# Patient Record
Sex: FEMALE | Race: AMERICAN INDIAN OR ALASKA NATIVE | NOT HISPANIC OR LATINO | Employment: UNEMPLOYED | URBAN - METROPOLITAN AREA
[De-identification: names, ages, dates, MRNs, and addresses within clinical notes are randomized per-mention and may not be internally consistent; named-entity substitution may affect disease eponyms.]

---

## 2017-07-05 ENCOUNTER — OFFICE VISIT (OUTPATIENT)
Dept: NEUROLOGY | Facility: CLINIC | Age: 52
End: 2017-07-05
Payer: MEDICAID

## 2017-07-05 VITALS
HEIGHT: 67 IN | WEIGHT: 203.06 LBS | HEART RATE: 79 BPM | SYSTOLIC BLOOD PRESSURE: 128 MMHG | DIASTOLIC BLOOD PRESSURE: 80 MMHG | BODY MASS INDEX: 31.87 KG/M2

## 2017-07-05 DIAGNOSIS — G54.1 LUMBOSACRAL PLEXOPATHY: Primary | ICD-10-CM

## 2017-07-05 PROCEDURE — 99203 OFFICE O/P NEW LOW 30 MIN: CPT | Mod: PBBFAC | Performed by: PSYCHIATRY & NEUROLOGY

## 2017-07-05 PROCEDURE — 99999 PR PBB SHADOW E&M-NEW PATIENT-LVL III: CPT | Mod: PBBFAC,,, | Performed by: PSYCHIATRY & NEUROLOGY

## 2017-07-05 PROCEDURE — 99205 OFFICE O/P NEW HI 60 MIN: CPT | Mod: S$PBB,,, | Performed by: PSYCHIATRY & NEUROLOGY

## 2017-07-05 RX ORDER — PRAVASTATIN SODIUM 40 MG/1
40 TABLET ORAL DAILY
COMMUNITY
End: 2018-11-26

## 2017-07-05 RX ORDER — METOPROLOL SUCCINATE 25 MG/1
25 TABLET, EXTENDED RELEASE ORAL NIGHTLY
COMMUNITY
End: 2021-10-06

## 2017-07-05 RX ORDER — METHYLPREDNISOLONE 4 MG/1
TABLET ORAL
Qty: 1 PACKAGE | Refills: 0 | Status: SHIPPED | OUTPATIENT
Start: 2017-07-05 | End: 2017-07-26

## 2017-07-05 NOTE — PROGRESS NOTES
"Name: Perri Youngblood  MRN: [id number]   CSN: [id number]      Date: 07/05/2017    Referring physician:  Aung Sr III, MD  [address] [place] [id number]    Chief Complaint / Interval History: No chief complaint on file.      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   Went downhill two weeks after, seemed to have severe quad pain and weakness.  Felt heavy in the leg.  Has had persistent spasms in the L thigh.    On June 24, had a detailed ultrasound of the left leg.  Looking for blood flow issues.  Said it's not the stent, but must be "severe nerve damage".    Nonmotor/Premotor ROS:  Hyposmia (HENT)?No  RBD/sleep issues (Constitutional)?No  Depression/anxiety (Psychiatric)?No  Fatigue (Constitutional)?No  Constipation (GI)?No  Urinary issues ()?No  Sexual dysfunction ()?No  Orthostasis (Cardiovascular)?No  Leg swelling (Cardiovascular)? No  Falls (Musculoskeletal)?No  Cognitive impairment (Neurologic)?No  Psychoses (Psychiatric)?No  Pain/Paresthesia (Neurologic)?No  Visual changes (Eyes)?No  Moles / skin changes (Skin)?No  Stridor / SOB (Pulm)?No  Bruising (Heme)?No    Past Medical History: The patient  has no past medical history on file.    Social History: The patient  reports that she has never smoked. She has never used smokeless tobacco. She reports that she does not drink alcohol.    Family History: Their family history is not on file.    Allergies: Review of patient's allergies indicates no known allergies.     Meds:   No current outpatient prescriptions on file prior to visit.     No current facility-administered medications on file prior to visit.        Exam:  /80   Pulse 79   Ht 5' 7" (1.702 m)   Wt 92.1 kg (203 lb 0.7 oz)   BMI 31.80 kg/m²     Constitutional  Well-developed, well-nourished, appears stated age   Ophthalmoscopic  No papilledema with no hemorrhages or exudates bilaterally   Cardiovascular "  Radial pulses 2+ and symmetric, no LE edema bilaterally   Neurological    * Mental status  MOCA =      - Orientation  Oriented to person, place, time, and situation     - Memory   Intact recent and remote     - Attention/concentration  Attentive, vigilant during exam     - Language  Naming & repetition intact, +2-step commands     - Fund of knowledge  Aware of current events     - Executive  Well-organized thoughts     - Other     * Cranial nerves       - CN II  PERRL, visual fields full to confrontation     - CN III, IV, VI  Extraocular movements full, normal pursuits and saccades     - CN V  Sensation V1 - V3 intact     - CN VII  Face strong and symmetric bilaterally     - CN VIII  Hearing intact bilaterally     - CN IX, X  Palate raises midline and symmetric     - CN XI  SCM and trapezius 5/5 bilaterally     - CN XII  Tongue midline   * Motor  Muscle bulk normal, strength 5/5 throughout   * Sensory   Dim to temperature and vibration throughout in approx left L2 below, had female attendant in room for detailed exam.   * Coordination  No dysmetria with finger-to-nose or heel-to-shin   * Gait  See below.   * Deep tendon reflexes  2+ and symmetric throughout   Babinski downgoing bilaterally     Laboratory/Radiological:  - Results:  No visits with results within 3 Month(s) from this visit.   Latest known visit with results is:   No results found for any previous visit.       - Independent review of images: no neuro imaging    Diagnoses:          Plexopathy versus radiculopathy versus nerve.    Medical Decision Making:  L2? Level seems consistent.  - emg  - mri  - pt    Lexa Beavers MD, MPH  Division of Movement and Memory Disorders  [facility]  [phone]

## 2017-07-05 NOTE — LETTER
July 11, 2017      Aung Sr III, MD  [address] [facility] [address]           [facility]  [address]  [place] [address]  Phone: [phone]  Fax: [phone]          Patient: Perri Youngblood   MR Number: [id number]   YOB: 1965   Date of Visit: 7/5/2017       Dear Dr. Aung Sr III:    Thank you for referring Perri Youngblood to me for evaluation. Attached you will find relevant portions of my assessment and plan of care.    If you have questions, please do not hesitate to call me. I look forward to following Perri Youngblood along with you.    Sincerely,    Lexa Beavers MD    Enclosure  CC:  No Recipients    If you would like to receive this communication electronically, please contact [email or web address] or [phone] to request more information on [employer] Link access.    For providers and/or their staff who would like to refer a patient to [facility], please contact us through our one-stop-shop provider referral line, [facility] [place], at [phone].    If you feel you have received this communication in error or would no longer like to receive these types of communications, please e-mail [email or web address]

## 2017-07-17 ENCOUNTER — HOSPITAL ENCOUNTER (OUTPATIENT)
Dept: RADIOLOGY | Facility: HOSPITAL | Age: 52
Discharge: HOME | End: 2017-07-17
Attending: PSYCHIATRY & NEUROLOGY
Payer: MEDICAID

## 2017-07-17 DIAGNOSIS — G54.1 LUMBOSACRAL PLEXOPATHY: ICD-10-CM

## 2017-07-17 PROCEDURE — 72148 MRI LUMBAR SPINE W/O DYE: CPT | Mod: TC

## 2017-07-17 PROCEDURE — 72148 MRI LUMBAR SPINE W/O DYE: CPT | Mod: 26,,, | Performed by: RADIOLOGY

## 2017-07-25 ENCOUNTER — PROCEDURE VISIT (OUTPATIENT)
Dept: NEUROLOGY | Facility: CLINIC | Age: 52
End: 2017-07-25
Payer: MEDICAID

## 2017-07-25 DIAGNOSIS — G54.1 LUMBOSACRAL PLEXOPATHY: ICD-10-CM

## 2017-07-25 PROCEDURE — 95886 MUSC TEST DONE W/N TEST COMP: CPT | Mod: PBBFAC,PO | Performed by: NEUROMUSCULOSKELETAL MEDICINE & OMM

## 2017-07-25 PROCEDURE — 95910 NRV CNDJ TEST 7-8 STUDIES: CPT | Mod: PBBFAC,PO | Performed by: NEUROMUSCULOSKELETAL MEDICINE & OMM

## 2017-07-25 PROCEDURE — 95886 MUSC TEST DONE W/N TEST COMP: CPT | Mod: 26,S$PBB,, | Performed by: NEUROMUSCULOSKELETAL MEDICINE & OMM

## 2017-07-25 PROCEDURE — 95910 NRV CNDJ TEST 7-8 STUDIES: CPT | Mod: 26,S$PBB,, | Performed by: NEUROMUSCULOSKELETAL MEDICINE & OMM

## 2017-08-10 ENCOUNTER — TELEPHONE (OUTPATIENT)
Dept: NEUROLOGY | Facility: CLINIC | Age: 52
End: 2017-08-10

## 2017-08-10 NOTE — TELEPHONE ENCOUNTER
----- Message from Rao Crisostomo sent at 8/9/2017  3:58 PM CDT -----  Contact: Self @ [phone]  Pt is calling for results on MRI and nerve damage test. Pls call.

## 2017-08-24 ENCOUNTER — TELEPHONE (OUTPATIENT)
Dept: NEUROLOGY | Facility: CLINIC | Age: 52
End: 2017-08-24

## 2017-08-24 NOTE — TELEPHONE ENCOUNTER
----- Message from Grettagreyson Landhua sent at 8/24/2017  2:13 PM CDT -----  Contact: self @ [phone] or belinda () @ [phone]  Pt is calling for her MRI results from 7-17-17 and her EMG results from 7-25-17.  Pt says she is still in a lot of pain.  pls call to discuss asap.

## 2017-08-24 NOTE — TELEPHONE ENCOUNTER
Spoken to patient  and he stated that Pt would like her MRI results from 7-17-17 and her EMG results from 7-25-17..      Please call regarding results!   Perri [phone]

## 2017-09-20 ENCOUNTER — TELEPHONE (OUTPATIENT)
Dept: NEUROLOGY | Facility: CLINIC | Age: 52
End: 2017-09-20

## 2017-09-20 NOTE — TELEPHONE ENCOUNTER
----- Message from Rao Crisostomo sent at 9/20/2017 10:42 AM CDT -----  Contact: Self @ [phone]  Pt would like to schedule f/u with doctor to discuss EMG (07/25)/ MRI (07/17) results. Pls call.

## 2017-09-20 NOTE — TELEPHONE ENCOUNTER
Pt would like a call regarding her results on her MRI and EMG. 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    Perri  [phone]

## 2017-12-11 ENCOUNTER — OFFICE VISIT (OUTPATIENT)
Dept: NEUROLOGY | Facility: CLINIC | Age: 52
End: 2017-12-11
Payer: MEDICAID

## 2017-12-11 VITALS
HEART RATE: 88 BPM | SYSTOLIC BLOOD PRESSURE: 110 MMHG | BODY MASS INDEX: 29.76 KG/M2 | HEIGHT: 67 IN | DIASTOLIC BLOOD PRESSURE: 68 MMHG | WEIGHT: 189.63 LBS

## 2017-12-11 DIAGNOSIS — M54.17 LUMBOSACRAL RADICULOPATHY AT L2: Primary | ICD-10-CM

## 2017-12-11 PROCEDURE — 99999 PR PBB SHADOW E&M-EST. PATIENT-LVL III: CPT | Mod: PBBFAC,,, | Performed by: PSYCHIATRY & NEUROLOGY

## 2017-12-11 PROCEDURE — 99214 OFFICE O/P EST MOD 30 MIN: CPT | Mod: S$PBB,,, | Performed by: PSYCHIATRY & NEUROLOGY

## 2017-12-11 PROCEDURE — 99213 OFFICE O/P EST LOW 20 MIN: CPT | Mod: PBBFAC | Performed by: PSYCHIATRY & NEUROLOGY

## 2017-12-11 RX ORDER — OXYBUTYNIN CHLORIDE 5 MG/1
TABLET, EXTENDED RELEASE ORAL
COMMUNITY
Start: 2017-12-08 | End: 2017-12-18 | Stop reason: ALTCHOICE

## 2017-12-11 RX ORDER — ALENDRONATE SODIUM 70 MG/1
TABLET ORAL
COMMUNITY
Start: 2017-12-08 | End: 2018-12-12

## 2017-12-11 RX ORDER — GABAPENTIN 300 MG/1
CAPSULE ORAL
COMMUNITY
Start: 2017-12-08 | End: 2017-12-22

## 2017-12-11 NOTE — PROGRESS NOTES
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   Went downhill two weeks after, seemed to have severe quad pain and weakness.  Felt heavy in the leg.  Has had persistent spasms in the L thigh.    On June 24, had a detailed ultrasound of the left leg.  Looking for blood flow issues.  Said it's not the stent, but must be "severe nerve damage".    Nonmotor/Premotor ROS:  Hyposmia (HENT)?No  RBD/sleep issues (Constitutional)?No  Depression/anxiety (Psychiatric)?No  Fatigue (Constitutional)?No  Constipation (GI)?No  Urinary issues ()?No  Sexual dysfunction ()?No  Orthostasis (Cardiovascular)?No  Leg swelling (Cardiovascular)? No  Falls (Musculoskeletal)?No  Cognitive impairment (Neurologic)?No  Psychoses (Psychiatric)?No  Pain/Paresthesia (Neurologic)?No  Visual changes (Eyes)?No  Moles / skin changes (Skin)?No  Stridor / SOB (Pulm)?No  Bruising (Heme)?No    Past Medical History: The patient  has no past medical history on file.    Social History: The patient  reports that she has never smoked. She has never used smokeless tobacco. She reports that she does not drink alcohol.    Family History: Their family history is not on file.    Allergies: Patient has no known allergies.     Meds:   Current Outpatient Prescriptions on File Prior to Visit   Medication Sig Dispense Refill    metoprolol succinate (TOPROL-XL) 25 MG 24 hr tablet Take 25 mg by mouth once daily.      pravastatin (PRAVACHOL) 40 MG tablet Take 40 mg by mouth once daily.       No current facility-administered medications on file prior to visit.        Exam:  /68   Pulse 88   Ht 5' 7" (1.702 m)   Wt 86 kg (189 lb 9.5 oz)   BMI 29.69 kg/m² "     Constitutional  Well-developed, well-nourished, appears stated age   Cardiovascular  Radial pulses 2+ and symmetric, no LE edema bilaterally   Neurological    * Mental status  MOCA deferred      - Orientation  Oriented to person, place, time, and situation     - Memory   Intact recent and remote     - Attention/concentration  Attentive, vigilant during exam     - Language  Naming & repetition intact, +2-step commands     - Fund of knowledge  Aware of current events     - Executive  Well-organized thoughts     - Other     * Cranial nerves       - CN II  PERRL, visual fields full to confrontation     - CN III, IV, VI  Extraocular movements full, normal pursuits and saccades     - CN V  Sensation V1 - V3 intact     - CN VII  Face strong and symmetric bilaterally     - CN VIII  Hearing intact bilaterally     - CN IX, X  Palate raises midline and symmetric     - CN XI  SCM and trapezius 5/5 bilaterally     - CN XII  Tongue midline   * Motor  Muscle bulk normal, strength 5/5 throughout except 4+/5 abduction L>R   * Sensory   Deferred this exam   * Coordination  No dysmetria with finger-to-nose or heel-to-shin   * Gait  See below.   * Deep tendon reflexes  2+ and symmetric throughout - intact   Babinski downgoing bilaterally     Laboratory/Radiological:  - Results:        Reviewed EMG:  Conclusion:   BILATERAL L2-4 CHRONIC NEUROGENIC ATROPHY CONSISTENT WITH RADICULOPATHY   LEFT > > RIGHT     Diagnoses:          L2-3 lumbar radiculopathy    Medical Decision Making:  - referral to Back and Spine    Lexa Beavers MD, MPH  Division of Movement and Memory Disorders  [facility]  [phone]

## 2017-12-21 NOTE — PROGRESS NOTES
Subjective:      Patient ID: Perri Youngblood is a 52 y.o. female.    Chief Complaint: Low-back Pain and Leg Pain (bilateral)    Ms Youngblood is a 53 yo female sent by Dr. Beavers for evaluation of low back and left leg pain.  She has had low back pain sine jan 2016 when she had a stent placed in her left leg.  The pain started 2 weeks after.  The pain is worse in the lower back and goes to the right lower back around to the groin on the left and down the outside of the left leg to the knee.  The right front of the leg is starting to hurt.  The pain is constant.  The pain is worse with standing and walking.  She feels better with sitting.  She feels like heat also feels better.  The pain is a sharp pain.  There is no numbness and no tingling.  The pain is 8/10 now, worst 10/10 walking, best 5/10 with heat.  She uses asper cream, and all the creams.  She has taken gabapentin but stopped taking it.  She was taking 900 mg.  She has tried advil and aleve or tylenol.  She had a steroid felicitas that did help for about a month.      EMG  BILATERAL L2-4 CHRONIC NEUROGENIC ATROPHY CONSISTENT WITH RADICULOPATHY  LEFT > > RIGHT    MRI lumbar spine 7/17/2017  Slight grade 1 retrolisthesis of L2 on L3. The vertebral body heights are well maintained, with no fracture.  No marrow signal abnormality suspicious for an infiltrative process.  Multilevel degenerative disc disease with desiccation and severe disc height loss with endplate changes, most notably at L2-3.    The conus is normal in appearance, and terminates at the L1-2 level. Partially-imaged exophytic, 1.4 cm fluid signal focus adjacent to the right kidney, likely a renal cyst. The adjacent soft tissue structures show no significant abnormalities.        L1-L2:  No significant spinal canal or neuroforaminal narrowing.    L2-L3: Circumferential disc bulge with a superimposed small right paracentral disc extrusion extending a few millimeters superiorly, and mild  bilateral facet arthrosis result in mild spinal canal stenosis and mild right neuroforaminal narrowing.    L3-L4: Circumferential disc bulge, ligamentum flavum hypertrophy, and mild bilateral facet arthrosis contributes to moderate right and mild left neuroforaminal narrowing with possible impingement on the exiting right L3 nerve root. No spinal canal stenosis.    L4-L5: Circumferential disc bulge, ligamentum flavum hypertrophy, and mild bilateral facet arthrosis contributing mild right neuroforaminal narrowing. No significant spinal canal stenosis.    L5-S1: Small posterior disc protrusion and mild bilateral facet arthrosis without significant spinal canal stenosis or neuroforaminal narrowing.  Impression          Lumbar spondylosis, resulting in mild spinal canal stenosis at L2-3, and mild/moderate neuroforaminal narrowing at L2-3 and L3-4, as above.    Small disc extrusion at L2-3.        Reviewed EMG:  Conclusion:   BILATERAL L2-4 CHRONIC NEUROGENIC ATROPHY CONSISTENT WITH RADICULOPATHY   LEFT > > RIGHT      Diagnoses:                                                                                           L2-3 lumbar radiculopathy    No past medical history on file.    No past surgical history on file.    No family history on file.      Social History    Marital status:              Spouse name:                       Years of education:                 Number of children:               Social History Main Topics    Smoking status: Never Smoker                                                                Smokeless tobacco: Never Used                        Alcohol use: No                Current Outpatient Prescriptions:  alendronate (FOSAMAX) 70 MG tablet, , Disp: , Rfl:   aspirin (ECOTRIN) 81 MG EC tablet, Take 81 mg by mouth once daily., Disp: , Rfl:   gabapentin (NEURONTIN) 300 MG capsule, , Disp: , Rfl:   metoprolol succinate (TOPROL-XL) 25 MG 24 hr tablet, Take 25 mg by mouth once daily., Disp: ,  Rfl:   oxybutynin (DITROPAN-XL) 10 MG 24 hr tablet, Take 1 tablet (10 mg total) by mouth once daily., Disp: 30 tablet, Rfl: 11  pravastatin (PRAVACHOL) 40 MG tablet, Take 40 mg by mouth once daily., Disp: , Rfl:     No current facility-administered medications for this visit.       Review of patient's allergies indicates:   -- Bactrim (sulfamethoxazole-trimethoprim) -- Rash   -- Sulfa (sulfonamide antibiotics) -- Hives          Review of Systems   Constitution: Negative for weight gain and weight loss.   Cardiovascular: Negative for chest pain.   Respiratory: Negative for shortness of breath.    Musculoskeletal: Positive for back pain (left leg pain). Negative for joint pain and joint swelling.   Gastrointestinal: Negative for abdominal pain and bowel incontinence.   Genitourinary: Negative for bladder incontinence.   Neurological: Negative for numbness.         Objective:        General: Perri is well-developed, well-nourished, appears stated age, in no acute distress, alert and oriented to time, place and person.     General    Vitals reviewed.  Constitutional: She is oriented to person, place, and time. She appears well-developed and well-nourished.   HENT:   Head: Normocephalic and atraumatic.   Pulmonary/Chest: Effort normal.   Neurological: She is alert and oriented to person, place, and time.   Psychiatric: She has a normal mood and affect. Her behavior is normal. Judgment and thought content normal.     General Musculoskeletal Exam   Gait: antalgic     Right Ankle/Foot Exam     Tests   Heel Walk: able to perform  Tiptoe Walk: able to perform    Left Ankle/Foot Exam     Tests   Heel Walk: able to perform  Tiptoe Walk: able to perform  Left Hip Exam     Tenderness   The patient tender to palpation of the psoas tendon.    Range of Motion   Extension: 0   Flexion: 80   Internal Rotation: 20 (with leg pain)   External Rotation: 50 (with leg pain)       Back (L-Spine & T-Spine) / Neck (C-Spine) Exam     Tenderness  Posterior midline palpation reveals tenderness of the Lower L-Spine.     Back (L-Spine & T-Spine) Range of Motion   Extension: 10   Flexion: 70   Lateral Bend Right: 20   Lateral Bend Left: 20   Rotation Right: 40   Rotation Left: 40     Spinal Sensation   Right Side Sensation  C-Spine Level: normal   L-Spine Level: normal  S-Spine Level: normal  Left Side Sensation  C-Spine Level: normal  L-Spine Level: normal  S-Spine Level: normal    Back (L-Spine & T-Spine) Tests   Right Side Tests  Straight leg raise:      Sitting SLR: > 70 degrees      Left Side Tests  Straight leg raise:     Sitting SLR: > 70 degrees          Other She has no scoliosis .  Spinal Kyphosis:  Absent      Muscle Strength   Right Upper Extremity   Biceps: 5/5/5   Deltoid:  5/5  Triceps:  5/5  Wrist Extension: 5/5/5   Finger Flexors:  5/5  Left Upper Extremity  Biceps: 5/5/5   Deltoid:  5/5  Triceps:  5/5  Wrist Extension: 5/5/5   Finger Flexors:  5/5  Right Lower Extremity   Hip Flexion: 5/5   Quadriceps:  5/5   Anterior tibial:  5/5/5  EHL:  5/5  Left Lower Extremity   Hip Flexion: 5/5   Quadriceps:  5/5   Anterior tibial:  5/5/5   EHL:  5/5    Reflexes     Left Side  Biceps:  2+  Triceps:  2+  Brachioradialis:  2+  Quadriceps:  2+  Achilles:  2+  Left Gutierrez's Sign:  Absent  Babinski Sign:  absent    Right Side   Biceps:  2+  Triceps:  2+  Brachioradialis:  2+  Quadriceps:  2+  Achilles:  2+  Right Gutierrez's Sign:  absent  Babinski Sign:  absent    Vascular Exam     Right Pulses        Carotid:                  2+    Left Pulses        Carotid:                  2+              Assessment:       1. Chronic midline low back pain with bilateral sciatica    2. Pain of left hip joint    3. DDD (degenerative disc disease), lumbar           Plan:       Orders Placed This Encounter    X-Ray Lumbar Complete With Flex And Ext    X-Ray Hips Bilateral 2 View Inc AP Pelvis    Ambulatory Referral to Physical/Occupational Therapy    meloxicam (MOBIC) 15  MG tablet    gabapentin (NEURONTIN) 300 MG capsule     More than 50% of the total time of 45 minutes was spent in counseling on diagnosis and treatment options.  We discussed back and leg pain and the nature of back and leg pain.  We discussed the L2 radiculopathy, but also discussed hip DJD with the limited hip rom.  We discussed her gait, and usually more from hip problems.  We also discussed ROM  Limited, and pain recreated by moving the hip.  We will get x-rays.   We discussed the benefits of therapy and exercise and continuing to move. She has not done therapy.  She has also not had nsaid  1.  X-ray of the left hip and lumbar spine  2.  mobic 15 mg po Qday  3.  PT for back strengthening, core strengthening, hip ROM and hip strengthening and HEP in [place]  4.  Gabapentin 300-900 at night  5.  RTC 10 weeks      Follow-up: Return in about 10 weeks (around 3/2/2018). If there are any questions prior to this, the patient was instructed to contact the office.

## 2017-12-22 ENCOUNTER — HOSPITAL ENCOUNTER (OUTPATIENT)
Dept: RADIOLOGY | Facility: OTHER | Age: 52
Discharge: HOME | End: 2017-12-22
Attending: PHYSICAL MEDICINE & REHABILITATION
Payer: MEDICAID

## 2017-12-22 ENCOUNTER — OFFICE VISIT (OUTPATIENT)
Dept: SPINE | Facility: CLINIC | Age: 52
End: 2017-12-22
Attending: PHYSICAL MEDICINE & REHABILITATION
Payer: MEDICAID

## 2017-12-22 ENCOUNTER — TELEPHONE (OUTPATIENT)
Dept: SPINE | Facility: CLINIC | Age: 52
End: 2017-12-22

## 2017-12-22 VITALS
DIASTOLIC BLOOD PRESSURE: 68 MMHG | HEIGHT: 67 IN | BODY MASS INDEX: 29.03 KG/M2 | WEIGHT: 185 LBS | HEART RATE: 75 BPM | SYSTOLIC BLOOD PRESSURE: 119 MMHG

## 2017-12-22 DIAGNOSIS — G89.29 CHRONIC MIDLINE LOW BACK PAIN WITH BILATERAL SCIATICA: ICD-10-CM

## 2017-12-22 DIAGNOSIS — M54.41 CHRONIC MIDLINE LOW BACK PAIN WITH BILATERAL SCIATICA: Primary | ICD-10-CM

## 2017-12-22 DIAGNOSIS — M51.36 DDD (DEGENERATIVE DISC DISEASE), LUMBAR: ICD-10-CM

## 2017-12-22 DIAGNOSIS — M25.552 PAIN OF LEFT HIP JOINT: ICD-10-CM

## 2017-12-22 DIAGNOSIS — M54.41 CHRONIC MIDLINE LOW BACK PAIN WITH BILATERAL SCIATICA: ICD-10-CM

## 2017-12-22 DIAGNOSIS — M54.42 CHRONIC MIDLINE LOW BACK PAIN WITH BILATERAL SCIATICA: Primary | ICD-10-CM

## 2017-12-22 DIAGNOSIS — M54.42 CHRONIC MIDLINE LOW BACK PAIN WITH BILATERAL SCIATICA: ICD-10-CM

## 2017-12-22 DIAGNOSIS — G89.29 CHRONIC MIDLINE LOW BACK PAIN WITH BILATERAL SCIATICA: Primary | ICD-10-CM

## 2017-12-22 PROCEDURE — 72114 X-RAY EXAM L-S SPINE BENDING: CPT | Mod: 26,,, | Performed by: RADIOLOGY

## 2017-12-22 PROCEDURE — 99204 OFFICE O/P NEW MOD 45 MIN: CPT | Mod: S$PBB,,, | Performed by: PHYSICAL MEDICINE & REHABILITATION

## 2017-12-22 PROCEDURE — 73521 X-RAY EXAM HIPS BI 2 VIEWS: CPT | Mod: TC

## 2017-12-22 PROCEDURE — 73521 X-RAY EXAM HIPS BI 2 VIEWS: CPT | Mod: 26,,, | Performed by: RADIOLOGY

## 2017-12-22 PROCEDURE — 72114 X-RAY EXAM L-S SPINE BENDING: CPT | Mod: TC

## 2017-12-22 PROCEDURE — 99214 OFFICE O/P EST MOD 30 MIN: CPT | Mod: PBBFAC | Performed by: PHYSICAL MEDICINE & REHABILITATION

## 2017-12-22 PROCEDURE — 99999 PR PBB SHADOW E&M-EST. PATIENT-LVL IV: CPT | Mod: PBBFAC,,, | Performed by: PHYSICAL MEDICINE & REHABILITATION

## 2017-12-22 RX ORDER — GABAPENTIN 300 MG/1
300-900 CAPSULE ORAL NIGHTLY
Qty: 90 CAPSULE | Refills: 0
Start: 2017-12-22 | End: 2018-02-27

## 2017-12-22 RX ORDER — MELOXICAM 15 MG/1
15 TABLET ORAL DAILY
Qty: 30 TABLET | Refills: 2 | Status: SHIPPED | OUTPATIENT
Start: 2017-12-22 | End: 2018-05-30 | Stop reason: SDUPTHER

## 2017-12-22 NOTE — LETTER
December 22, 2017      Lexa Beavers MD  [address]  [facility] [address]           [facility]  [address], [address]  [facility] [address]  Phone: [phone]  Fax: [phone]          Patient: Perri Youngblood   MR Number: [id number]   YOB: 1965   Date of Visit: 12/22/2017       Dear Dr. Lexa Beavers:    Thank you for referring Perri Youngblood to me for evaluation. Attached you will find relevant portions of my assessment and plan of care.    If you have questions, please do not hesitate to call me. I look forward to following Perri Youngblood along with you.    Sincerely,    Cassy Cunha MD    Enclosure  CC:  No Recipients    If you would like to receive this communication electronically, please contact [email or web address] or [phone] to request more information on [employer] Link access.    For providers and/or their staff who would like to refer a patient to [facility], please contact us through our one-stop-shop provider referral line, [facility], at [phone].    If you feel you have received this communication in error or would no longer like to receive these types of communications, please e-mail [email or web address]

## 2017-12-22 NOTE — TELEPHONE ENCOUNTER
X-ray of the hip was reviewed.  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  She will proceed with therapy and we will get her scheduled to see someone for her left hip DJD

## 2017-12-28 ENCOUNTER — INITIAL CONSULT (OUTPATIENT)
Dept: ORTHOPEDICS | Facility: CLINIC | Age: 52
End: 2017-12-28
Payer: MEDICAID

## 2017-12-28 DIAGNOSIS — M16.12 ARTHRITIS OF LEFT HIP: Primary | ICD-10-CM

## 2017-12-28 PROCEDURE — 99204 OFFICE O/P NEW MOD 45 MIN: CPT | Mod: S$PBB,,, | Performed by: ORTHOPAEDIC SURGERY

## 2017-12-28 PROCEDURE — 99212 OFFICE O/P EST SF 10 MIN: CPT | Mod: PBBFAC | Performed by: ORTHOPAEDIC SURGERY

## 2017-12-28 PROCEDURE — 99999 PR PBB SHADOW E&M-EST. PATIENT-LVL II: CPT | Mod: PBBFAC,,, | Performed by: ORTHOPAEDIC SURGERY

## 2017-12-28 NOTE — LETTER
December 29, 2017      Cassy Cunha MD  [address]  [address]  [facility]  [facility] [address]           [facility]  [address]  [place] [address]  Phone: [phone]          Patient: Perri Youngblood   MR Number: [id number]   YOB: 1965   Date of Visit: 12/28/2017       Dear Dr. Cassy Cunha:    Thank you for referring Perri Youngblood to me for evaluation. Attached you will find relevant portions of my assessment and plan of care.    If you have questions, please do not hesitate to call me. I look forward to following Perri Youngblood along with you.    Sincerely,    Hal Oviedo MD    Enclosure  CC:  No Recipients    If you would like to receive this communication electronically, please contact [email or web address] or [phone] to request more information on [employer] Link access.    For providers and/or their staff who would like to refer a patient to [facility], please contact us through our one-stop-shop provider referral line, [facility], at [phone].    If you feel you have received this communication in error or would no longer like to receive these types of communications, please e-mail [email or web address]

## 2017-12-30 NOTE — PROGRESS NOTES
DATE: 12/29/2017  PATIENT: Perri Youngblood    Attending Physician: Hal Oviedo M.D.    CHIEF COMPLAINT: Left groin pain.    HISTORY:  Perri Youngblood is a 52 y.o. female  here for initial evaluation of low back pain and left groin pain (Back - 8, Groin - 8). The pain has been present since she had a Left iliac artery stent. The patient describes the pain as aching in the L groin.  The pain is worse with putting on shoes and socks and improved by a heatign pad. There is no associated numbness and tingling. There is no subjective weakness. Prior treatments have included mobic, but no PT or injections.    The Patient denies myelopathic symptoms such as handwriting changes or difficulty with buttons/coins/keys. Denies perineal paresthesias, bowel/bladder dysfunction.    PAST MEDICAL/SURGICAL HISTORY:  No past medical history on file.  No past surgical history on file.    Current Medications:   Current Outpatient Prescriptions:     alendronate (FOSAMAX) 70 MG tablet, , Disp: , Rfl:     aspirin (ECOTRIN) 81 MG EC tablet, Take 81 mg by mouth once daily., Disp: , Rfl:     gabapentin (NEURONTIN) 300 MG capsule, Take 1-3 capsules (300-900 mg total) by mouth every evening., Disp: 90 capsule, Rfl: 0    meloxicam (MOBIC) 15 MG tablet, Take 1 tablet (15 mg total) by mouth once daily., Disp: 30 tablet, Rfl: 2    metoprolol succinate (TOPROL-XL) 25 MG 24 hr tablet, Take 25 mg by mouth once daily., Disp: , Rfl:     oxybutynin (DITROPAN-XL) 10 MG 24 hr tablet, Take 1 tablet (10 mg total) by mouth once daily., Disp: 30 tablet, Rfl: 11    pravastatin (PRAVACHOL) 40 MG tablet, Take 40 mg by mouth once daily., Disp: , Rfl:     Social History:   Social History     Social History    Marital status:      Spouse name: N/A    Number of children: N/A    Years of education: N/A     Occupational History    Not on file.     Social History Main Topics    Smoking status: Never Smoker    Smokeless tobacco:  Never Used    Alcohol use No    Drug use: Unknown    Sexual activity: Not on file     Other Topics Concern    Not on file     Social History Narrative    No narrative on file       REVIEW OF SYSTEMS:  Constitution: Negative. Negative for chills, fever and night sweats.   Cardiovascular: Negative for chest pain and syncope.   Respiratory: Negative for cough and shortness of breath.   Gastrointestinal: See HPI. Negative for nausea/vomiting. Negative for abdominal pain.  Genitourinary: See HPI. Negative for discoloration or dysuria.  Skin: Negative for dry skin, itching and rash.   Hematologic/Lymphatic: Negative for bleeding/clotting disorders.   Musculoskeletal: Negative for falls and muscle weakness.   Neurological: See HPI. no history of seizures. no history of cranial surgery or shunts.  Endocrine: Negative for polydipsia, polyphagia and polyuria.   Allergic/Immunologic: Negative for hives and persistent infections.    PHYSICAL EXAMINATION:    There were no vitals taken for this visit.    General: The patient is a very pleasant 52 y.o. female in no apparent distress, the patient is orientatied to person, place and time.   Psych: Normal mood and affect  HEENT: Vision grossly intact, hearing intact to the spoken word.  Lungs: Respirations unlabored.  Gait: The patient walks with a L sided antalgic gait.  Skin: Dorsal lumbar skin negative for rashes, lesions, hairy patches and surgical scars.  Range of motion: Lumbar range of motion is acceptable. There is no lumbar tenderness to palpation.  Spinal Balance: Global saggital and coronal spinal balance acceptable, no significant for scoliosis and kyphosis.  Musculoskeletal: The patient has significant pain with left hip internal rotation, reproductive of her symptoms. No trochanteric tenderness to palpation.  Vascular: Bilateral lower extremities warm and well perfused, Dorsalis pedis pulses 2+ bilaterally.  Neurological: Normal strength and tone in all major motor  groups in the bilateral lower extremities. Normal sensation to light touch in the L2-S1 dermatomes bilaterally.  Deep tendon reflexes symmetric 2+ in the bilateral lower extremities.  Negative Babinski bilaterally.    IMAGING:   Today I personally reviewed AP, Lat and Flex/Ex upright L-spine films that demonstrate minimal L2/3 spondylosis. There is a central L2/3 disc buldge. She has severe L hip OA     ASSESSMENT/PLAN:    Perri was seen today for back pain.    Diagnoses and all orders for this visit:    Arthritis of left hip  -     Case Request Operating Room: ARTHROGRAM L hip  Corticosteroid injection  Flat OSI Table  No Implants  Trauma room      Plan for diagnostic corticosteroid injection of L hip.

## 2018-01-02 ENCOUNTER — ANESTHESIA EVENT (OUTPATIENT)
Dept: SURGERY | Facility: HOSPITAL | Age: 53
End: 2018-01-02
Payer: MEDICAID

## 2018-01-02 ENCOUNTER — TELEPHONE (OUTPATIENT)
Dept: ORTHOPEDICS | Facility: CLINIC | Age: 53
End: 2018-01-02

## 2018-01-02 NOTE — TELEPHONE ENCOUNTER
Tried to call and remind patient to be at the surgery center at [facility] second floor tom at 7:00 am but there was no answer or machine to leave message.

## 2018-01-03 ENCOUNTER — HOSPITAL ENCOUNTER (OUTPATIENT)
Facility: HOSPITAL | Age: 53
Discharge: HOME | End: 2018-01-03
Attending: ORTHOPAEDIC SURGERY | Admitting: ORTHOPAEDIC SURGERY
Payer: MEDICAID

## 2018-01-03 ENCOUNTER — ANESTHESIA (OUTPATIENT)
Dept: SURGERY | Facility: HOSPITAL | Age: 53
End: 2018-01-03
Payer: MEDICAID

## 2018-01-03 VITALS
SYSTOLIC BLOOD PRESSURE: 117 MMHG | RESPIRATION RATE: 18 BRPM | BODY MASS INDEX: 29.03 KG/M2 | OXYGEN SATURATION: 100 % | DIASTOLIC BLOOD PRESSURE: 66 MMHG | TEMPERATURE: 98 F | HEIGHT: 67 IN | HEART RATE: 70 BPM | WEIGHT: 185 LBS

## 2018-01-03 DIAGNOSIS — M16.10 HIP ARTHRITIS: Primary | ICD-10-CM

## 2018-01-03 PROCEDURE — 37000009 HC ANESTHESIA EA ADD 15 MINS: Performed by: ORTHOPAEDIC SURGERY

## 2018-01-03 PROCEDURE — 63600175 PHARM REV CODE 636 W HCPCS: Performed by: NURSE ANESTHETIST, CERTIFIED REGISTERED

## 2018-01-03 PROCEDURE — 25000003 PHARM REV CODE 250: Performed by: ANESTHESIOLOGY

## 2018-01-03 PROCEDURE — 37000008 HC ANESTHESIA 1ST 15 MINUTES: Performed by: ORTHOPAEDIC SURGERY

## 2018-01-03 PROCEDURE — 01200 ANES ALL CLOSED PX HIP JOINT: CPT | Performed by: ORTHOPAEDIC SURGERY

## 2018-01-03 PROCEDURE — 25500020 PHARM REV CODE 255: Performed by: ORTHOPAEDIC SURGERY

## 2018-01-03 PROCEDURE — 36000705 HC OR TIME LEV I EA ADD 15 MIN: Performed by: ORTHOPAEDIC SURGERY

## 2018-01-03 PROCEDURE — D9220A PRA ANESTHESIA: Mod: CRNA,,, | Performed by: NURSE ANESTHETIST, CERTIFIED REGISTERED

## 2018-01-03 PROCEDURE — 71000015 HC POSTOP RECOV 1ST HR: Performed by: ORTHOPAEDIC SURGERY

## 2018-01-03 PROCEDURE — S0020 INJECTION, BUPIVICAINE HYDRO: HCPCS | Performed by: ORTHOPAEDIC SURGERY

## 2018-01-03 PROCEDURE — 63600175 PHARM REV CODE 636 W HCPCS: Performed by: ORTHOPAEDIC SURGERY

## 2018-01-03 PROCEDURE — 36000704 HC OR TIME LEV I 1ST 15 MIN: Performed by: ORTHOPAEDIC SURGERY

## 2018-01-03 PROCEDURE — 71000044 HC DOSC ROUTINE RECOVERY FIRST HOUR: Performed by: ORTHOPAEDIC SURGERY

## 2018-01-03 PROCEDURE — 77002 NEEDLE LOCALIZATION BY XRAY: CPT | Mod: 26,,, | Performed by: ORTHOPAEDIC SURGERY

## 2018-01-03 PROCEDURE — D9220A PRA ANESTHESIA: Mod: ANES,,, | Performed by: ANESTHESIOLOGY

## 2018-01-03 PROCEDURE — 27095 INJECTION FOR HIP X-RAY: CPT | Mod: LT,,, | Performed by: ORTHOPAEDIC SURGERY

## 2018-01-03 PROCEDURE — 25000003 PHARM REV CODE 250: Performed by: ORTHOPAEDIC SURGERY

## 2018-01-03 RX ORDER — SODIUM CHLORIDE 0.9 % (FLUSH) 0.9 %
3 SYRINGE (ML) INJECTION
Status: DISCONTINUED | OUTPATIENT
Start: 2018-01-03 | End: 2018-01-03 | Stop reason: HOSPADM

## 2018-01-03 RX ORDER — MUPIROCIN 20 MG/G
1 OINTMENT TOPICAL 2 TIMES DAILY
Status: DISCONTINUED | OUTPATIENT
Start: 2018-01-03 | End: 2018-01-03 | Stop reason: HOSPADM

## 2018-01-03 RX ORDER — BUPIVACAINE HYDROCHLORIDE 5 MG/ML
INJECTION, SOLUTION EPIDURAL; INTRACAUDAL
Status: DISCONTINUED | OUTPATIENT
Start: 2018-01-03 | End: 2018-01-03 | Stop reason: HOSPADM

## 2018-01-03 RX ORDER — SODIUM CHLORIDE 0.9 % (FLUSH) 0.9 %
5 SYRINGE (ML) INJECTION
Status: DISCONTINUED | OUTPATIENT
Start: 2018-01-03 | End: 2018-01-03 | Stop reason: HOSPADM

## 2018-01-03 RX ORDER — LIDOCAINE HYDROCHLORIDE 10 MG/ML
5 INJECTION, SOLUTION EPIDURAL; INFILTRATION; INTRACAUDAL; PERINEURAL ONCE
Status: DISCONTINUED | OUTPATIENT
Start: 2018-01-03 | End: 2018-01-03 | Stop reason: HOSPADM

## 2018-01-03 RX ORDER — HYDROMORPHONE HYDROCHLORIDE 2 MG/ML
0.2 INJECTION, SOLUTION INTRAMUSCULAR; INTRAVENOUS; SUBCUTANEOUS EVERY 5 MIN PRN
Status: DISCONTINUED | OUTPATIENT
Start: 2018-01-03 | End: 2018-01-03 | Stop reason: HOSPADM

## 2018-01-03 RX ORDER — LIDOCAINE HCL/PF 100 MG/5ML
SYRINGE (ML) INTRAVENOUS
Status: DISCONTINUED | OUTPATIENT
Start: 2018-01-03 | End: 2018-01-03

## 2018-01-03 RX ORDER — MEPERIDINE HYDROCHLORIDE 50 MG/ML
12.5 INJECTION INTRAMUSCULAR; INTRAVENOUS; SUBCUTANEOUS ONCE AS NEEDED
Status: DISCONTINUED | OUTPATIENT
Start: 2018-01-03 | End: 2018-01-03 | Stop reason: HOSPADM

## 2018-01-03 RX ORDER — HYDROCODONE BITARTRATE AND ACETAMINOPHEN 5; 325 MG/1; MG/1
1 TABLET ORAL EVERY 4 HOURS PRN
Status: DISCONTINUED | OUTPATIENT
Start: 2018-01-03 | End: 2018-01-03 | Stop reason: HOSPADM

## 2018-01-03 RX ORDER — PROPOFOL 10 MG/ML
VIAL (ML) INTRAVENOUS
Status: DISCONTINUED | OUTPATIENT
Start: 2018-01-03 | End: 2018-01-03

## 2018-01-03 RX ORDER — TRIAMCINOLONE ACETONIDE 40 MG/ML
INJECTION, SUSPENSION INTRA-ARTICULAR; INTRAMUSCULAR
Status: DISCONTINUED | OUTPATIENT
Start: 2018-01-03 | End: 2018-01-03 | Stop reason: HOSPADM

## 2018-01-03 RX ORDER — SODIUM CHLORIDE 9 MG/ML
INJECTION, SOLUTION INTRAVENOUS CONTINUOUS
Status: DISCONTINUED | OUTPATIENT
Start: 2018-01-03 | End: 2018-01-03 | Stop reason: HOSPADM

## 2018-01-03 RX ORDER — MIDAZOLAM HYDROCHLORIDE 1 MG/ML
INJECTION, SOLUTION INTRAMUSCULAR; INTRAVENOUS
Status: DISCONTINUED | OUTPATIENT
Start: 2018-01-03 | End: 2018-01-03

## 2018-01-03 RX ADMIN — SODIUM CHLORIDE: 0.9 INJECTION, SOLUTION INTRAVENOUS at 07:01

## 2018-01-03 RX ADMIN — PROPOFOL 30 MG: 10 INJECTION, EMULSION INTRAVENOUS at 08:01

## 2018-01-03 RX ADMIN — PROPOFOL 70 MG: 10 INJECTION, EMULSION INTRAVENOUS at 08:01

## 2018-01-03 RX ADMIN — MIDAZOLAM HYDROCHLORIDE 2 MG: 1 INJECTION, SOLUTION INTRAMUSCULAR; INTRAVENOUS at 07:01

## 2018-01-03 RX ADMIN — LIDOCAINE HYDROCHLORIDE 30 MG: 20 INJECTION, SOLUTION INTRAVENOUS at 08:01

## 2018-01-03 NOTE — DISCHARGE INSTRUCTIONS
Anesthesia: General Anesthesia     You are watched continuously during your procedure by your anesthesia provider.     Youre due to have surgery. During surgery, youll be given medicine called anesthesia or anesthetic. This will keep you comfortable and pain-free. Your anesthesia provider will use general anesthesia.  What is general anesthesia?  General anesthesia puts you into a state like deep sleep. It goes into the bloodstream (IV anesthetics), into the lungs (gas anesthetics), or both. You feel nothing during the procedure. You will not remember it. During the procedure, the anesthesia provider monitors you continuously. He or she checks your heart rate and rhythm, blood pressure, breathing, and blood oxygen.  · IV anesthetics. IV anesthetics are given through an IV line in your arm. Theyre often given first. This is so you are asleep before a gas anesthetic is started. Some kinds of IV anesthetics relieve pain. Others relax you. Your doctor will decide which kind is best in your case.  · Gas anesthetics. Gas anesthetics are breathed into the lungs. They are often used to keep you asleep. They can be given through a facemask or a tube placed in your larynx or trachea (breathing tube).  ¨ If you have a facemask, your anesthesia provider will most likely place it over your nose and mouth while youre still awake. Youll breathe oxygen through the mask as your IV anesthetic is started. Gas anesthetic may be added through the mask.  ¨ If you have a tube in the larynx or trachea, it will be inserted into your throat after youre asleep.  Anesthesia tools and medicines  You will likely have:  · IV anesthetics. These are put into an IV line into your bloodstream.  · Gas anesthetics. You breathe these anesthetics into your lungs, where they pass into your bloodstream.  · Pulse oximeter. This is a small clip that is attached to the end of your finger. This measures your blood oxygen level.  · Electrocardiography  leads (electrodes). These are small sticky pads that are placed on your chest. They record your heart rate and rhythm.  · Blood pressure cuff. This reads your blood pressure.  Risks and possible complications  General anesthesia has some risks. These include:  · Breathing problems  · Nausea and vomiting  · Sore throat or hoarseness (usually temporary)  · Allergic reaction to the anesthetic  · Irregular heartbeat (rare)  · Cardiac arrest (rare)   Anesthesia safety  · Follow all instructions you are given for how long not to eat or drink before your procedure.  · Be sure your doctor knows what medicines and drugs you take. This includes over-the-counter medicines, herbs, supplements, alcohol or other drugs. You will be asked when those were last taken.  · Have an adult family member or friend drive you home after the procedure.  · For the first 24 hours after your surgery:  ¨ Do not drive or use heavy equipment.  ¨ Do not make important decisions or sign legal documents. If important decisions or signing legal documents is necessary during the first 24 hours after surgery, have a trusted family member or spouse act on your behalf.  ¨ Avoid alcohol.  ¨ Have a responsible adult stay with you. He or she can watch for problems and help keep you safe.  Date Last Reviewed: 12/1/2016  © 6916-0850 [employer] [address] [place] [address], [place] [address]. All rights reserved. This information is not intended as a substitute for professional medical care. Always follow your healthcare professional's instructions.

## 2018-01-03 NOTE — ANESTHESIA PREPROCEDURE EVALUATION
01/03/2018  Perri Youngblood is a 52 y.o., female.    Anesthesia Evaluation    I have reviewed the Patient Summary Reports.    I have reviewed the Nursing Notes.   I have reviewed the Medications.     Review of Systems  Anesthesia Hx:  No problems with previous Anesthesia  Denies Family Hx of Anesthesia complications.   Denies Personal Hx of Anesthesia complications.   Social:  Non-Smoker    Cardiovascular:  Cardiovascular Normal Exercise tolerance: good   Functional Capacity good / => 4 METS    Pulmonary:  Pulmonary Normal        Physical Exam   Airway/Jaw/Neck:  Airway Findings: Mouth Opening: Normal Tongue: Normal  General Airway Assessment: Adult, Good  Mallampati: II  TM Distance: Normal, at least 6 cm       Chest/Lungs:  Chest/Lungs Findings: Normal Respiratory Rate         Mental Status:  Mental Status Findings:  Cooperative, Alert and Oriented         Anesthesia Plan  Type of Anesthesia, risks & benefits discussed:  Anesthesia Type:  general  Patient's Preference: General  Intra-op Monitoring Plan: standard ASA monitors  Intra-op Monitoring Plan Comments:   Post Op Pain Control Plan: per primary service following discharge from PACU  Post Op Pain Control Plan Comments: Per primary service  Induction:   IV  Beta Blocker:  Patient is not currently on a Beta-Blocker (No further documentation required).       Informed Consent: Patient understands risks and agrees with Anesthesia plan.  Questions answered. Anesthesia consent signed with patient.  ASA Score: 2     Day of Surgery Review of History & Physical:    H&P update referred to the surgeon.         Ready For Surgery From Anesthesia Perspective.

## 2018-01-03 NOTE — BRIEF OP NOTE
[facility]  Brief Operative Note     SUMMARY     Surgery Date: 1/3/2018     Surgeon(s) and Role:     * Hal Oviedo MD - Primary    Assisting Surgeon: None    Pre-op Diagnosis:  Arthritis of left hip [M16.12]    Post-op Diagnosis:  Post-Op Diagnosis Codes:     * Arthritis of left hip [M16.12]    Procedure(s) (LRB):  ARTHROGRAM L hip Corticosteroid injection Flat OSI Table No Implants Trauma room (Left)    Anesthesia: General    Description of the findings of the procedure: see op note    Findings/Key Components: see op note    Estimated Blood Loss: * No values recorded between 1/3/2018  8:09 AM and 1/3/2018  8:15 AM *         Specimens:   Specimen (12h ago through future)    None          Discharge Note    SUMMARY     Admit Date: 1/3/2018    Discharge Date and Time:  01/03/2018 8:18 AM    Hospital Course:  The patient arrived to the Day of Surgery Center on the second floor of [facility] for proper pre-operative management.  Upon completion of pre-operative preparation, the patient was taken back to the operative theatre.  A left hip injection was performed without complication and the patient was transported to the post anesthesia care unit in stable condition.     Caballero: nil    Drain: nil    Pain Management:     - [facility]: nil    After appropriate recovery from the anaesthetic agents used during the surgery the patient was discharged home without complications      Final Diagnosis: Post-Op Diagnosis Codes:     * Arthritis of left hip [M16.12]    Disposition: Home or Self Care    Follow Up/Patient Instructions:     Medications:  Reconciled Home Medications:   Current Discharge Medication List      CONTINUE these medications which have NOT CHANGED    Details   aspirin (ECOTRIN) 81 MG EC tablet Take 81 mg by mouth once daily.      gabapentin (NEURONTIN) 300 MG capsule Take 1-3 capsules (300-900 mg total) by mouth every evening.  Qty: 90 capsule, Refills: 0      meloxicam  (MOBIC) 15 MG tablet Take 1 tablet (15 mg total) by mouth once daily.  Qty: 30 tablet, Refills: 2      metoprolol succinate (TOPROL-XL) 25 MG 24 hr tablet Take 25 mg by mouth once daily.      oxybutynin (DITROPAN-XL) 10 MG 24 hr tablet Take 1 tablet (10 mg total) by mouth once daily.  Qty: 30 tablet, Refills: 11      pravastatin (PRAVACHOL) 40 MG tablet Take 40 mg by mouth once daily. Takes for 10 days off for 10 days      alendronate (FOSAMAX) 70 MG tablet Take by mouth every 7 days.              Discharge Procedure Orders  Diet general     Call MD for:  temperature >100.4     Call MD for:  persistent nausea and vomiting     Call MD for:  severe uncontrolled pain     Call MD for:  difficulty breathing, headache or visual disturbances     Call MD for:  redness, tenderness, or signs of infection (pain, swelling, redness, odor or green/yellow discharge around incision site)     Call MD for:  hives     Call MD for:  persistent dizziness or light-headedness     Call MD for:  extreme fatigue     Activity as tolerated     Weight bearing as tolerated     No dressing needed       Follow-up Information     Hal Oviedo MD In 2 weeks.    Specialties:  Orthopedic Surgery, Spine Surgery  Why:  For wound re-check  Contact information:  Vivien FUNEZ MAE  [facility] [id number] [phone]

## 2018-01-03 NOTE — TRANSFER OF CARE
"Anesthesia Transfer of Care Note    Patient: Perri Youngblood    Procedure(s) Performed: Procedure(s) (LRB):  ARTHROGRAM L hip Corticosteroid injection Flat OSI Table No Implants Trauma room (Left)    Patient location: [facility]    Anesthesia Type: general    Transport from OR: Transported from OR on room air with adequate spontaneous ventilation    Post pain: adequate analgesia    Post assessment: no apparent anesthetic complications and tolerated procedure well    Post vital signs: stable    Level of consciousness: awake, alert and oriented    Nausea/Vomiting: no nausea/vomiting    Complications: none    Transfer of care protocol was followed      Last vitals:   Visit Vitals  /75 (BP Location: Right arm, Patient Position: Lying)   Pulse 84   Temp 36.4 °C (97.5 °F) (Oral)   Resp 18   Ht 5' 7" (1.702 m)   Wt 83.9 kg (185 lb)   SpO2 97%   Breastfeeding? No   BMI 28.98 kg/m²     "

## 2018-01-03 NOTE — PLAN OF CARE
Band-Aid noted to right groin; clean, dry and intact. Patient is sleeping intermittently. Denies pain. VSS.

## 2018-01-04 NOTE — OP NOTE
DATE OF PROCEDURE:  01/03/2018.    SURGEON:  Hal Oviedo M.D.    PREOPERATIVE DIAGNOSIS:  Symptomatic left hip osteoarthritis.    POSTOPERATIVE DIAGNOSIS:  Symptomatic left hip osteoarthritis.    PROCEDURES PERFORMED:  1.  Left hip arthrogram.  2.  Left hip corticosteroid injection under fluoroscopy.    ANESTHESIA:  Intravenous.    IMPLANTS:  None.    FINDINGS:  None.    DRAINS:  None.    ESTIMATED BLOOD LOSS:  Zero.    SPONGE AND NEEDLE COUNT:  Correct x2.    REASON FOR OPERATION AND BRIEF HISTORY AND PHYSICAL:  Perri Youngblood is a   52-year-old female with symptomatic left hip osteoarthritis.  An attempt to   further evaluate her symptoms and differentiate them from back pain, we are   planning a left hip corticosteroid injection under fluoroscopy today.    DESCRIPTION OF PROCEDURE:  The patient was met in the preoperative area where   the left hip was marked as the operative site.  Subsequently, she was brought to   the Operating Room, where she was positioned on a radiolucent table.  Left   groin was prepped and draped in normal sterile fashion.    A full timeout was then called identifying the patient, the procedural site and   levels and no specific nursing, surgical, or anesthetic concerns.  Finding that   it was safe to proceed with surgery, I placed a 20-gauge spinal needle into the   left hip joint via an anterior approach.  I then infiltrated IV contrast media   into the left hip and saw a positive arthrogram.  I then injected 3 mL of 0.5%   Marcaine and 40 mg of Kenalog through the needle and placed a sterile dressing.    The patient tolerated the procedure well.

## 2018-01-04 NOTE — ANESTHESIA POSTPROCEDURE EVALUATION
"Anesthesia Post Evaluation    Patient: Perri Youngblood    Procedure(s) Performed: Procedure(s) (LRB):  ARTHROGRAM L hip Corticosteroid injection  (Left)    Final Anesthesia Type: MAC  Patient location during evaluation: PACU  Patient participation: Yes- Able to Participate  Level of consciousness: awake and alert  Post-procedure vital signs: reviewed and stable  Pain management: adequate  Airway patency: patent  PONV status at discharge: No PONV  Anesthetic complications: no      Cardiovascular status: blood pressure returned to baseline and hemodynamically stable  Respiratory status: unassisted, spontaneous ventilation and room air  Hydration status: euvolemic  Follow-up not needed.        Visit Vitals  /66 (BP Location: Right arm, Patient Position: Lying)   Pulse 70   Temp 36.7 °C (98 °F) (Temporal)   Resp 18   Ht 5' 7" (1.702 m)   Wt 83.9 kg (185 lb)   SpO2 100%   Breastfeeding? No   BMI 28.98 kg/m²       Pain/Mahad Score: Pain Assessment Performed: Yes (1/3/2018  8:49 AM)  Presence of Pain: denies (1/3/2018  8:49 AM)  Mahad Score: 9 (1/3/2018  8:19 AM)      "

## 2018-01-26 ENCOUNTER — OFFICE VISIT (OUTPATIENT)
Dept: URGENT CARE | Facility: CLINIC | Age: 53
End: 2018-01-26
Payer: MEDICAID

## 2018-01-26 VITALS
WEIGHT: 185 LBS | HEART RATE: 91 BPM | DIASTOLIC BLOOD PRESSURE: 69 MMHG | BODY MASS INDEX: 29.03 KG/M2 | SYSTOLIC BLOOD PRESSURE: 115 MMHG | HEIGHT: 67 IN | OXYGEN SATURATION: 97 % | TEMPERATURE: 99 F

## 2018-01-26 DIAGNOSIS — M54.50 ACUTE BILATERAL LOW BACK PAIN WITHOUT SCIATICA: Primary | ICD-10-CM

## 2018-01-26 PROCEDURE — 99203 OFFICE O/P NEW LOW 30 MIN: CPT | Mod: S$GLB,,, | Performed by: FAMILY MEDICINE

## 2018-01-26 RX ORDER — HYDROCODONE BITARTRATE AND ACETAMINOPHEN 7.5; 325 MG/1; MG/1
1 TABLET ORAL EVERY 4 HOURS PRN
Qty: 10 TABLET | Refills: 0 | Status: SHIPPED | OUTPATIENT
Start: 2018-01-26 | End: 2018-02-27

## 2018-01-26 RX ORDER — NAPROXEN 500 MG/1
500 TABLET ORAL 2 TIMES DAILY WITH MEALS
Qty: 20 TABLET | Refills: 0 | Status: SHIPPED | OUTPATIENT
Start: 2018-01-26 | End: 2018-02-06

## 2018-01-26 RX ORDER — CHLORZOXAZONE 500 MG/1
500 TABLET ORAL 4 TIMES DAILY PRN
Qty: 40 TABLET | Refills: 0 | Status: SHIPPED | OUTPATIENT
Start: 2018-01-26 | End: 2018-02-05

## 2018-01-26 NOTE — LETTER
January 26, 2018  Perri Youngblood  [address]  [place] [address]                [facility]  [address], [address]  [facility] [address]  Phone: [phone]  Fax: [phone] Perri Youngblood was seen and treated in our Urgent Care department on 1/26/2018. She may return to work in 2 - 3 days.      If you have any questions or concerns, please don't hesitate to call.    Sincerely,        Jose Laughlin MD

## 2018-01-26 NOTE — PROGRESS NOTES
"Subjective:       Patient ID: Perri Youngblood is a 52 y.o. female.    Vitals:  height is 5' 7" (1.702 m) and weight is 83.9 kg (185 lb). Her oral temperature is 98.7 °F (37.1 °C). Her blood pressure is 115/69 and her pulse is 91. Her oxygen saturation is 97%.     Chief Complaint: Back Pain    Back Pain   This is a new problem. The current episode started today. The problem occurs constantly. The problem is unchanged. The pain is present in the lumbar spine. The quality of the pain is described as cramping and stabbing. The pain does not radiate. The pain is at a severity of 10/10. The pain is severe. The pain is the same all the time. The symptoms are aggravated by standing. Stiffness is present all day. Pertinent negatives include no abdominal pain, bladder incontinence, bowel incontinence, dysuria, leg pain, numbness or tingling. She has tried heat for the symptoms. The treatment provided no relief.     Review of Systems   Constitution: Negative for malaise/fatigue.   Skin: Negative for color change and rash.   Musculoskeletal: Positive for back pain and muscle cramps. Negative for muscle weakness and stiffness.   Gastrointestinal: Negative for abdominal pain and bowel incontinence.   Genitourinary: Negative for bladder incontinence, dysuria, hematuria and urgency.   Neurological: Negative for disturbances in coordination, numbness and tingling.       Objective:      Physical Exam   Constitutional: She is oriented to person, place, and time. Vital signs are normal. She appears well-developed and well-nourished. She is active and cooperative.  Non-toxic appearance. She does not appear ill. No distress.   HENT:   Head: Normocephalic and atraumatic.   Right Ear: Hearing, tympanic membrane, external ear and ear canal normal.   Left Ear: Hearing, tympanic membrane, external ear and ear canal normal.   Nose: Nose normal. No mucosal edema, rhinorrhea or nasal deformity. No epistaxis. Right sinus exhibits no " maxillary sinus tenderness and no frontal sinus tenderness. Left sinus exhibits no maxillary sinus tenderness and no frontal sinus tenderness.   Mouth/Throat: Uvula is midline, oropharynx is clear and moist and mucous membranes are normal. No trismus in the jaw. Normal dentition. No uvula swelling. No posterior oropharyngeal erythema.   Eyes: Conjunctivae and lids are normal. No scleral icterus.   Sclera clear bilat   Neck: Trachea normal, normal range of motion, full passive range of motion without pain and phonation normal. Neck supple.   Cardiovascular: Normal rate, regular rhythm, normal heart sounds, intact distal pulses and normal pulses.    Pulmonary/Chest: Effort normal and breath sounds normal. No respiratory distress.   Abdominal: Soft. Normal appearance and bowel sounds are normal. She exhibits no distension, no abdominal bruit, no pulsatile midline mass and no mass. There is no tenderness.   Musculoskeletal: She exhibits no edema or deformity.        Lumbar back: She exhibits decreased range of motion, tenderness, pain and spasm.   Neurological: She is alert and oriented to person, place, and time. She has normal strength and normal reflexes. No sensory deficit. She exhibits normal muscle tone. Coordination normal.   Skin: Skin is warm, dry and intact. She is not diaphoretic. No pallor.   Psychiatric: She has a normal mood and affect. Her speech is normal and behavior is normal. Judgment and thought content normal. Cognition and memory are normal.   Nursing note and vitals reviewed.      Assessment:       1. Acute bilateral low back pain without sciatica        Plan:         Acute bilateral low back pain without sciatica    Other orders  -     naproxen (NAPROSYN) 500 MG tablet; Take 1 tablet (500 mg total) by mouth 2 (two) times daily with meals.  Dispense: 20 tablet; Refill: 0  -     hydrocodone-acetaminophen 7.5-325mg (NORCO) 7.5-325 mg per tablet; Take 1 tablet by mouth every 4 (four) hours as needed  for Pain.  Dispense: 10 tablet; Refill: 0  -     chlorzoxazone (PARAFON FORTE) 500 mg Tab; Take 1 tablet (500 mg total) by mouth 4 (four) times daily as needed.  Dispense: 40 tablet; Refill: 0      Please drink plenty of fluids.  Please get plenty of rest.  Please return here or go to the Emergency Department for any concerns or worsening of condition.  If you were prescribed a narcotic medication, do not drive or operate heavy equipment or machinery while taking these medications.  If you were not prescribed an anti-inflammatory medication, and if you do not have any history of stomach/intestinal ulcers, or kidney disease, or are not taking a blood thinner such as Coumadin, Plavix, Pradaxa, Eloquis, or Xaralta for example, it is OK to take over the counter Ibuprofen or Advil or Motrin or Aleve as directed.  Do not take these medications on an empty stomach.  If you lose control of your bowel and/or bladder, please go to the nearest Emergency Department immediately.  If you lose sensation in between your legs by your genitalia and/or rectum, please go to the nearest Emergency Department immediately.  If you lose control or sensation of any extremity, please go to the nearest Emergency Department immediately.    Moist heat (heating Pad) several times a day to back for relief and comfort.  If you  smoke, please stop smoking.    Please follow up with your primary care doctor or specialist as needed.  Leeroy Johnson MD  [phone]      ** Patient was informed today that we do not treat Chronic Pain at [facility].  You were given a short course of medication to hold you over a couple of days to contact your regular doctor.  Further refills on pain medication must be prescribed by your Primary Care Physician or by Chronic Pain management and cannot be rewritten at [facility].

## 2018-02-06 ENCOUNTER — OFFICE VISIT (OUTPATIENT)
Dept: ORTHOPEDICS | Facility: CLINIC | Age: 53
End: 2018-02-06
Payer: MEDICAID

## 2018-02-06 VITALS — HEIGHT: 67 IN | BODY MASS INDEX: 29.03 KG/M2 | WEIGHT: 184.94 LBS

## 2018-02-06 DIAGNOSIS — M16.12 ARTHRITIS OF LEFT HIP: Primary | ICD-10-CM

## 2018-02-06 PROCEDURE — 99999 PR PBB SHADOW E&M-EST. PATIENT-LVL III: CPT | Mod: PBBFAC,,, | Performed by: PHYSICIAN ASSISTANT

## 2018-02-06 PROCEDURE — 99213 OFFICE O/P EST LOW 20 MIN: CPT | Mod: PBBFAC | Performed by: PHYSICIAN ASSISTANT

## 2018-02-06 PROCEDURE — 99213 OFFICE O/P EST LOW 20 MIN: CPT | Mod: S$PBB,,, | Performed by: PHYSICIAN ASSISTANT

## 2018-02-06 PROCEDURE — 3008F BODY MASS INDEX DOCD: CPT | Mod: ,,, | Performed by: PHYSICIAN ASSISTANT

## 2018-02-06 RX ORDER — METHOCARBAMOL 750 MG/1
750 TABLET, FILM COATED ORAL 3 TIMES DAILY
Qty: 60 TABLET | Refills: 0 | Status: SHIPPED | OUTPATIENT
Start: 2018-02-06 | End: 2018-02-26

## 2018-02-06 NOTE — PROGRESS NOTES
"DATE: 2/6/2018  PATIENT: Perri Youngblood    Attending Physician: Hal Oviedo M.D.    HISTORY:  Perri Youngblood is a 52 y.o. female who returns to me today for follow up of low back and left groin pain.  She was last seen by Dr. Oviedo 12/29/2017.  Today she is doing well but notes she had an intra-articular cortisone injection in the left hip 1/3/2018 with great relief of her pain but it only lasted a few days.  Today she reports 10/10 low back and left hip pain.      The Patient denies myelopathic symptoms such as handwriting changes or difficulty with buttons/coins/keys. Denies perineal paresthesias, bowel/bladder dysfunction.    PMH/PSH/FamHx/SocHx:  Unchanged from prior visit    ROS:  REVIEW OF SYSTEMS:  Constitution: Negative. Negative for chills, fever and night sweats.   HENT: Negative for congestion and headaches.    Eyes: Negative for blurred vision, left vision loss and right vision loss.   Cardiovascular: Negative for chest pain and syncope.   Respiratory: Negative for cough and shortness of breath.    Endocrine: Negative for polydipsia, polyphagia and polyuria.   Hematologic/Lymphatic: Negative for bleeding problem. Does not bruise/bleed easily.   Skin: Negative for dry skin, itching and rash.   Musculoskeletal: Negative for falls and muscle weakness.   Gastrointestinal: Negative for abdominal pain and bowel incontinence.   Allergic/Immunologic: Negative for hives and persistent infections.  Genitourinary: Negative for urinary retention/incontinence and nocturia.   Neurological: Negative for disturbances in coordination, no myelopathic symptoms such as handwriting changes or difficulty with buttons, coins, keys or small objects. No loss of balance and seizures.   Psychiatric/Behavioral: Negative for depression. The patient does not have insomnia.   Denies perineal paresthesias, bowel or bladder incontinence    EXAM:  Ht 5' 7" (1.702 m)   Wt 83.9 kg (184 lb 15.5 oz)   BMI 28.97 " kg/m²     My physical examination was notable for the following findings:     Antalgic station and gait.   Dorsal lumbar skin negative for rashes, lesions, hairy patches and surgical scars. There is mild lumbar tenderness to palpation.  Lumbar range of motion is acceptable.  Global saggital and coronal spinal balance acceptable, not significant for scoliosis and kyphosis.  There is pain with the range of motion of the left hip. No trochanteric tenderness to palpation.  Bilateral lower extremities warm and well perfused, dorsalis pedis pulses 2+ bilaterally.  Normal strength and tone in all major motor groups in the bilateral lower extremities. Normal sensation to light touch in the L2-S1 dermatomes bilaterally.  Deep tendon reflexes symmetric 2+ in the bilateral lower extremities.  Negative Babinski bilaterally. Straight leg raise negative bilaterally.      IMAGING:  No new imaging today.    Today I personally re- reviewed AP, Lat and Flex/Ex  upright L-spine that demonstrate L2/3 spondylosis.  There is significant left hip DJD.    MRI lumbar spine demonstrates a small disc protrusion at L2/3 resulting in only mild stenosis.      ASSESSMENT/PLAN:    Diagnoses and all orders for this visit:    Arthritis of left hip    Other orders  -     methocarbamol (ROBAXIN) 750 MG Tab; Take 1 tablet (750 mg total) by mouth 3 (three) times daily. As needed for muscle spasms        Discussed with Dr. Oviedo.  The patient's symptoms were completely relieved with an intra-articular hip injection but was not long lasting.  I will refer to a surgeon to discuss a left MEGHA.        Follow-up if symptoms worsen or fail to improve.

## 2018-02-21 ENCOUNTER — TELEPHONE (OUTPATIENT)
Dept: ORTHOPEDICS | Facility: CLINIC | Age: 53
End: 2018-02-21

## 2018-02-21 ENCOUNTER — TELEPHONE (OUTPATIENT)
Dept: SPINE | Facility: CLINIC | Age: 53
End: 2018-02-21

## 2018-02-21 NOTE — TELEPHONE ENCOUNTER
Patient would like an referral for  office.  ----- Message from Divina Valentin sent at 2/21/2018   1:48 PM CST -----  _  1st Request  _  2nd Request  _  3rd Request        Who: patient     Why: Requesting a call back in regards to put a referral  in the system for Dr tere olivo for the pt to have hip surgery.     What Number to Call Back:[phone]    When to Expect a call back: (Within 24 hours)    Please return the call at earliest convenience. Thanks!

## 2018-02-21 NOTE — TELEPHONE ENCOUNTER
The referral came from dunia griffith and Dr. Oviedo?  She has an appointment scheduled for 2/27.  Is there a problem with it?

## 2018-02-27 ENCOUNTER — INITIAL CONSULT (OUTPATIENT)
Dept: ORTHOPEDICS | Facility: CLINIC | Age: 53
End: 2018-02-27
Payer: MEDICAID

## 2018-02-27 VITALS
WEIGHT: 180.31 LBS | HEIGHT: 67 IN | BODY MASS INDEX: 28.3 KG/M2 | DIASTOLIC BLOOD PRESSURE: 72 MMHG | SYSTOLIC BLOOD PRESSURE: 110 MMHG

## 2018-02-27 DIAGNOSIS — M16.12 PRIMARY OSTEOARTHRITIS OF LEFT HIP: Primary | ICD-10-CM

## 2018-02-27 PROCEDURE — 3008F BODY MASS INDEX DOCD: CPT | Mod: ,,, | Performed by: ORTHOPAEDIC SURGERY

## 2018-02-27 PROCEDURE — 99999 PR PBB SHADOW E&M-EST. PATIENT-LVL III: CPT | Mod: PBBFAC,,, | Performed by: ORTHOPAEDIC SURGERY

## 2018-02-27 PROCEDURE — 99213 OFFICE O/P EST LOW 20 MIN: CPT | Mod: S$PBB,,, | Performed by: ORTHOPAEDIC SURGERY

## 2018-02-27 PROCEDURE — 99213 OFFICE O/P EST LOW 20 MIN: CPT | Mod: PBBFAC,PN | Performed by: ORTHOPAEDIC SURGERY

## 2018-02-27 RX ORDER — SODIUM CHLORIDE 0.9 % (FLUSH) 0.9 %
3 SYRINGE (ML) INJECTION
Status: DISCONTINUED | OUTPATIENT
Start: 2018-02-27 | End: 2018-12-12

## 2018-02-27 RX ORDER — MUPIROCIN 20 MG/G
OINTMENT TOPICAL
Status: CANCELLED | OUTPATIENT
Start: 2018-02-27

## 2018-02-27 RX ORDER — OXYCODONE HCL 10 MG/1
10 TABLET, FILM COATED, EXTENDED RELEASE ORAL
Status: CANCELLED | OUTPATIENT
Start: 2018-02-27 | End: 2018-02-27

## 2018-02-27 RX ORDER — ACETAMINOPHEN 325 MG/1
1000 TABLET ORAL
Status: CANCELLED | OUTPATIENT
Start: 2018-02-27 | End: 2018-02-27

## 2018-02-27 RX ORDER — NAPROXEN 250 MG/1
500 TABLET ORAL
Status: CANCELLED | OUTPATIENT
Start: 2018-02-27 | End: 2018-02-27

## 2018-02-27 NOTE — PROGRESS NOTES
Subjective:      Patient ID: Perri Youngblood is a 53 y.o. female.    Chief Complaint: Pain and Swelling of the Left Hip    HPI      Perri Youngblood is seen for evaluation and treatment of hip pain.  They have experienced problems with their left hip over the past 3 years Pain is located in the groin and  referred to the knee. They have been treated with over the counter analgesics, NSAIDS and activity modification.   She also has done physical therapy which did not help.  Symptoms have recently worsened. Ambulation reportedly has been impaired. Self care ADLs are painful.  Intra-articular injection gave three weeks of good relief.    Review of Systems   Constitution: Negative for fever and weight loss.   HENT: Negative for congestion.    Eyes: Negative for visual disturbance.   Cardiovascular: Negative for chest pain.   Respiratory: Negative for shortness of breath.    Hematologic/Lymphatic: Negative for bleeding problem. Does not bruise/bleed easily.   Skin: Negative for poor wound healing.   Musculoskeletal: Positive for joint pain.   Gastrointestinal: Negative for abdominal pain.   Genitourinary: Negative for dysuria.   Neurological: Negative for seizures.   Psychiatric/Behavioral: Negative for altered mental status.   Allergic/Immunologic: Negative for persistent infections.         Objective:            Ortho/SPM Exam        Vitals:    02/27/18 1045   BP: 110/72      Left Hip    The patient is not in acute distress.   Body habitus is:normal.   The patient walks with a limp.   The skin over the hip is:intact.   There is:no local tenderness.  Range of motion- Flexion 70, External rotation 15, internal rotation -5.  Resisted SLR positive.  Pain with rotation positive  Sciatic tension findings negative.  Shortening/lengthening compared to the contralateral side exam deferred.  Pulses DP present, PT present.  Motor normal 5/5 strength in all tested muscle groups.   Sensory normal.    I reviewed the  relevant radiographic images for the patient's condition: There is advanced loss of joint space with osteophytes.      Assessment:       Encounter Diagnosis   Name Primary?    Primary osteoarthritis of left hip Yes          The patient's condition is radiographically very advanced, she has had extensive medications, injections as well as therapy without relief.  Plan:       Perri was seen today for pain and swelling.    Diagnoses and all orders for this visit:    Primary osteoarthritis of left hip      I explained my diagnostic impression and the reasoning behind it in detail, using layman's terms.  Models and/or pictures were used to help in the explanation.     left total hip replacement was explained to the patient.  The nature of the procedure was explained using a model.  The expected perioperative clinical course and period of recovery as applicable to the patient's condition was described.  The risks including death, infection, thromboembolic events, instability, leg length discrepancy, persistent pain/stiffness and implant failure due to wear or loosening were all explained.  The possibility and expectations of continued nonsurgical care were reviewed.  The patient understands and wishes to proceed with the recommended operation.

## 2018-02-27 NOTE — LETTER
February 27, 2018      Estrella Vilchis PA-C  [address]  [facility] [address]           [place] [facility]  [address] [facility] [address]  [facility] [address]  Phone: [phone]  Fax: [phone]          Patient: Perri Youngblood   MR Number: [id number]   YOB: 1965   Date of Visit: 2/27/2018       Dear Estrella Vilchis:    Thank you for referring Perri Younglbood to me for evaluation. Attached you will find relevant portions of my assessment and plan of care.    If you have questions, please do not hesitate to call me. I look forward to following Perri Youngblood along with you.    Sincerely,    Papa Crisostomo MD    Enclosure  CC:  No Recipients    If you would like to receive this communication electronically, please contact [email or web address] or [phone] to request more information on [employer] access.    For providers and/or their staff who would like to refer a patient to [facility], please contact us through our one-stop-shop provider referral line, [facility], at [phone].    If you feel you have received this communication in error or would no longer like to receive these types of communications, please e-mail [email or web address]

## 2018-03-29 ENCOUNTER — TELEPHONE (OUTPATIENT)
Dept: ORTHOPEDICS | Facility: CLINIC | Age: 53
End: 2018-03-29

## 2018-03-29 NOTE — TELEPHONE ENCOUNTER
Tried contact patient 3X to inform her of pre op and post op appointment. No answer no voicemail set up.

## 2018-04-02 ENCOUNTER — OFFICE VISIT (OUTPATIENT)
Dept: ORTHOPEDICS | Facility: CLINIC | Age: 53
End: 2018-04-02
Payer: MEDICAID

## 2018-04-02 VITALS
WEIGHT: 180 LBS | SYSTOLIC BLOOD PRESSURE: 118 MMHG | HEIGHT: 67 IN | BODY MASS INDEX: 28.25 KG/M2 | DIASTOLIC BLOOD PRESSURE: 74 MMHG

## 2018-04-02 DIAGNOSIS — M87.052 AVASCULAR NECROSIS OF BONE OF HIP, LEFT: Primary | ICD-10-CM

## 2018-04-02 PROCEDURE — 99999 PR PBB SHADOW E&M-EST. PATIENT-LVL III: CPT | Mod: PBBFAC,,, | Performed by: ORTHOPAEDIC SURGERY

## 2018-04-02 PROCEDURE — 99213 OFFICE O/P EST LOW 20 MIN: CPT | Mod: PBBFAC,PN | Performed by: ORTHOPAEDIC SURGERY

## 2018-04-02 PROCEDURE — 99499 UNLISTED E&M SERVICE: CPT | Mod: S$PBB,,, | Performed by: ORTHOPAEDIC SURGERY

## 2018-04-02 NOTE — H&P
Subjective:       Patient ID: Perri Youngblood is a 53 y.o. female.    Chief Complaint: Pre-op Exam of the Left Hip      Perri Youngblood is a 53 y.o. female with PMH significant for PVD s/p femoral stent here today for a pre-operative visit in preparation for a Left total hip arthroplasty to be performed by  Dr. Crisostomo on 4/16/18.  Perri Youngblood has a 2 year history of Left hip pain. Pain is worse with activity and weight bearing. Patient has experienced interference of ADLs due to increased pain and decreased range of motion. Patient has failed non-operative treatment including NSAIDs, activity modification, physical therapy, and corticosteroid injections. Perri Youngblood ambulates independently but with a painful limp. There has been no significant change in medical status since last visit.  She was seen by her cardiologist recently and had a stress test and ultrasounds preformed. She reports these exams were negative but we will get a copy of the results.    Past Medical History:   Diagnosis Date    Arthritis     May-Thurner syndrome     Osteopenia of lumbar spine     Osteoporosis     Peripheral vascular disease     Tachycardia      Past Surgical History:   Procedure Laterality Date    BLADDER SUSPENSION      CLOSED REDUCTION WRIST FRACTURE      FEMORAL ARTERY STENT      HYSTERECTOMY       Family History   Problem Relation Age of Onset    Hypertension Mother     Cancer Father      colon cancer     Social History     Social History    Marital status:      Spouse name: N/A    Number of children: N/A    Years of education: N/A     Social History Main Topics    Smoking status: Never Smoker    Smokeless tobacco: Never Used    Alcohol use No    Drug use: Unknown    Sexual activity: Not Asked     Other Topics Concern    None     Social History Narrative    None       Current Outpatient Prescriptions   Medication Sig Dispense Refill    alendronate (FOSAMAX) 70 MG  "tablet Take by mouth every 7 days.       aspirin (ECOTRIN) 81 MG EC tablet Take 81 mg by mouth once daily.      meloxicam (MOBIC) 15 MG tablet Take 1 tablet (15 mg total) by mouth once daily. 30 tablet 2    metoprolol succinate (TOPROL-XL) 25 MG 24 hr tablet Take 25 mg by mouth once daily.      oxybutynin (DITROPAN-XL) 10 MG 24 hr tablet Take 1 tablet (10 mg total) by mouth once daily. 30 tablet 11    pravastatin (PRAVACHOL) 40 MG tablet Take 40 mg by mouth once daily. Takes for 10 days off for 10 days       Current Facility-Administered Medications   Medication Dose Route Frequency Provider Last Rate Last Dose    sodium chloride 0.9% flush 3 mL  3 mL Intravenous PRN Papa Crisostomo MD         Review of patient's allergies indicates:   Allergen Reactions    Adhesive Rash    Bactrim [sulfamethoxazole-trimethoprim] Rash    Sulfa (sulfonamide antibiotics) Hives       Review of Systems   Constitutional: Negative for chills and fever.   Respiratory: Negative for shortness of breath and wheezing.    Cardiovascular: Negative for chest pain and palpitations.   Gastrointestinal: Negative for abdominal pain and blood in stool.   Genitourinary: Negative for hematuria.   Musculoskeletal: Positive for arthralgias and gait problem.   Skin: Negative for rash and wound.   Neurological: Negative for tremors and syncope.   Psychiatric/Behavioral: Negative for behavioral problems.       Objective:      Vitals:    04/02/18 1319   BP: 118/74   Weight: 81.6 kg (180 lb)   Height: 5' 7" (1.702 m)     Physical Exam   Constitutional: She is oriented to person, place, and time. She appears well-developed and well-nourished.   Cardiovascular: Normal rate and regular rhythm.  Exam reveals no gallop and no friction rub.    No murmur heard.  Pulmonary/Chest: Effort normal and breath sounds normal. She has no wheezes. She has no rales.   Abdominal: Soft. There is no tenderness.   Musculoskeletal:        Left hip: She exhibits decreased " range of motion ( Flexion 70, External rotation 15, internal rotation -5.) and tenderness. She exhibits normal strength.   Resisted SLR positive.  Pain with rotation.  Strength 5/5. Sensation intact.   Neurological: She is alert and oriented to person, place, and time.   Skin: Skin is warm. Capillary refill takes less than 2 seconds. No rash noted. She is not diaphoretic.   Psychiatric: She has a normal mood and affect.       Lab Review:   CBC: No results found for: WBC, RBC, HGB, HCT, PLT  BMP:   Lab Results   Component Value Date     12/18/2017     12/18/2017    K 3.8 12/18/2017     12/18/2017    CO2 29 12/18/2017    BUN 14 12/18/2017    CREATININE 0.8 12/18/2017    CALCIUM 9.3 12/18/2017     Diagnostics Review: X-Ray: Reviewed and shows evidence of AVN and severe narrowing of the superior left hip joint space with subchondral sclerosis and subchondral cyst formation.    Assessment:       1. Avascular necrosis of bone of hip, left        Plan:       Left total hip arthroplasty on 4/16/18.  Will get records from cardiologist.

## 2018-04-04 ENCOUNTER — TELEPHONE (OUTPATIENT)
Dept: ORTHOPEDICS | Facility: CLINIC | Age: 53
End: 2018-04-04

## 2018-04-04 NOTE — TELEPHONE ENCOUNTER
Tried contacting patient to get name of cardiologist to collect results.    ----- Message from Tyesha Bolden MA sent at 4/2/2018  2:56 PM CDT -----  Please call pt to get results from recent stress test, echo, and carotid US from non-[facility] cardiologist.     Thanks.

## 2018-04-05 ENCOUNTER — TELEPHONE (OUTPATIENT)
Dept: ORTHOPEDICS | Facility: CLINIC | Age: 53
End: 2018-04-05

## 2018-04-05 NOTE — TELEPHONE ENCOUNTER
----- Message from Sue Patrick sent at 4/5/2018 10:21 AM CDT -----  Contact: Self [phone]  Patient is calling to talk to nurse concerning if its safe to have dental work done even if she is having a procedure. Please advice

## 2018-04-06 ENCOUNTER — TELEPHONE (OUTPATIENT)
Dept: ORTHOPEDICS | Facility: CLINIC | Age: 53
End: 2018-04-06

## 2018-04-06 NOTE — TELEPHONE ENCOUNTER
Spoke with patient to inform her that she may have dental work done as long as its before procedure. If not she will need to wait after 6 months. Verbalized understanding.

## 2018-04-10 ENCOUNTER — TELEPHONE (OUTPATIENT)
Dept: ORTHOPEDICS | Facility: CLINIC | Age: 53
End: 2018-04-10

## 2018-04-10 NOTE — TELEPHONE ENCOUNTER
Tried contacting patient to get name of cardiologist for test results. No answer No voicemail.    ----- Message from Sarah Blas sent at 4/9/2018 12:08 PM CDT -----  Contact: self / [phone]  Patient is requesting a call back regarding, her class. Please advise

## 2018-04-11 ENCOUNTER — TELEPHONE (OUTPATIENT)
Dept: ORTHOPEDICS | Facility: CLINIC | Age: 53
End: 2018-04-11

## 2018-04-11 NOTE — TELEPHONE ENCOUNTER
Spoke with patient to get name of cardiologist. Patient states she saw . Advised patient that I will contact in order to get records. Verbalized understanding.    ----- Message from Jael Dockery sent at 4/11/2018  9:03 AM CDT -----  Contact: [phone]/self  Patient requesting to speak with you regarding scheduling a class before hip surgery. Please call and advise.

## 2018-04-16 ENCOUNTER — TELEPHONE (OUTPATIENT)
Dept: ORTHOPEDICS | Facility: CLINIC | Age: 53
End: 2018-04-16

## 2018-04-16 NOTE — TELEPHONE ENCOUNTER
----- Message from Sarah Blas sent at 4/16/2018  1:13 PM CDT -----  Contact: Dilma from case management - [phone]  Dilma from case management - [phone], needs to talk to you about the lever of care of the patient.  Please advise

## 2018-04-30 ENCOUNTER — OFFICE VISIT (OUTPATIENT)
Dept: ORTHOPEDICS | Facility: CLINIC | Age: 53
End: 2018-04-30
Payer: MEDICAID

## 2018-04-30 VITALS — WEIGHT: 179 LBS | HEIGHT: 67 IN | BODY MASS INDEX: 28.09 KG/M2

## 2018-04-30 DIAGNOSIS — Z96.642 S/P HIP REPLACEMENT, LEFT: Primary | ICD-10-CM

## 2018-04-30 DIAGNOSIS — M16.12 PRIMARY OSTEOARTHRITIS OF LEFT HIP: Primary | ICD-10-CM

## 2018-04-30 PROCEDURE — 99024 POSTOP FOLLOW-UP VISIT: CPT | Mod: ,,, | Performed by: ORTHOPAEDIC SURGERY

## 2018-04-30 PROCEDURE — 99999 PR PBB SHADOW E&M-EST. PATIENT-LVL II: CPT | Mod: PBBFAC,,, | Performed by: ORTHOPAEDIC SURGERY

## 2018-04-30 PROCEDURE — 99212 OFFICE O/P EST SF 10 MIN: CPT | Mod: PBBFAC,PN | Performed by: ORTHOPAEDIC SURGERY

## 2018-04-30 NOTE — PROGRESS NOTES
"Subjective:      Patient ID: Perri Youngblood is a 53 y.o. female.    Chief Complaint: Post-op Evaluation of the Left Hip      HPI:   The patient is seen for postop follow-up of left  MEGHA.  Pain control has been satisfactory  They feel that they are ambulating easily  Preoperative complaints include: NA      Current Outpatient Prescriptions:     alendronate (FOSAMAX) 70 MG tablet, Take by mouth every 7 days. , Disp: , Rfl:     aspirin (ECOTRIN) 81 MG EC tablet, Take 81 mg by mouth once daily., Disp: , Rfl:     meloxicam (MOBIC) 15 MG tablet, Take 1 tablet (15 mg total) by mouth once daily., Disp: 30 tablet, Rfl: 2    metoprolol succinate (TOPROL-XL) 25 MG 24 hr tablet, Take 25 mg by mouth once daily., Disp: , Rfl:     oxybutynin (DITROPAN-XL) 10 MG 24 hr tablet, Take 1 tablet (10 mg total) by mouth once daily., Disp: 30 tablet, Rfl: 11    oxyCODONE-acetaminophen (PERCOCET) 5-325 mg per tablet, 1-2 tablets 4 times daily as needed for pain, Disp: 60 tablet, Rfl: 0    pravastatin (PRAVACHOL) 40 MG tablet, Take 40 mg by mouth once daily. Takes for 10 days off for 10 days, Disp: , Rfl:     Current Facility-Administered Medications:     sodium chloride 0.9% flush 3 mL, 3 mL, Intravenous, PRN, Papa Crisostomo MD  Review of patient's allergies indicates:   Allergen Reactions    Adhesive Rash    Bactrim [sulfamethoxazole-trimethoprim] Rash    Sulfa (sulfonamide antibiotics) Hives       Ht 5' 7" (1.702 m)   Wt 81.2 kg (179 lb)   BMI 28.04 kg/m²     ROS        Objective:    Ortho Exam          Alert, oriented, no acute distress  non-ataxic  wound margins intact and healing well.  No signs of infection.  Range of motion: is fluid and painless  minimal    Assessment:     Imaging: I reviewed the relevant radiographic images for the patient's condition: There is well-positioned and well fixed left total hip without, locating process        No diagnosis found.    The patient's recovery is normal so far      "   Plan:          No Follow-up on file.    Gradual progression of activity explained.  Hip precautions reviewed

## 2018-05-25 ENCOUNTER — TELEPHONE (OUTPATIENT)
Dept: ORTHOPEDICS | Facility: CLINIC | Age: 53
End: 2018-05-25

## 2018-05-30 ENCOUNTER — TELEPHONE (OUTPATIENT)
Dept: ORTHOPEDICS | Facility: CLINIC | Age: 53
End: 2018-05-30

## 2018-05-30 RX ORDER — OXYCODONE AND ACETAMINOPHEN 5; 325 MG/1; MG/1
TABLET ORAL
Qty: 15 TABLET | Refills: 0 | Status: SHIPPED | OUTPATIENT
Start: 2018-05-30 | End: 2018-10-26

## 2018-05-30 NOTE — TELEPHONE ENCOUNTER
Please let her know that she can use the prescription medication at bedtime only.    During the daytime she needs to use over-the-counter medication

## 2018-05-30 NOTE — TELEPHONE ENCOUNTER
----- Message from Tara Sandra sent at 5/30/2018  3:13 PM CDT -----  Contact: self, [phone]  Patient requests her pain medication refill sent to pharmacy. Does not know the name. Please advise.

## 2018-05-31 RX ORDER — MELOXICAM 15 MG/1
15 TABLET ORAL DAILY
Qty: 30 TABLET | Refills: 2 | Status: SHIPPED | OUTPATIENT
Start: 2018-05-31 | End: 2018-08-10 | Stop reason: SDUPTHER

## 2018-06-18 ENCOUNTER — OFFICE VISIT (OUTPATIENT)
Dept: ORTHOPEDICS | Facility: CLINIC | Age: 53
End: 2018-06-18
Payer: MEDICAID

## 2018-06-18 VITALS — HEIGHT: 67 IN | BODY MASS INDEX: 28.09 KG/M2 | WEIGHT: 179 LBS

## 2018-06-18 DIAGNOSIS — Z96.642 S/P HIP REPLACEMENT, LEFT: Primary | ICD-10-CM

## 2018-06-18 DIAGNOSIS — M87.052 AVASCULAR NECROSIS OF BONE OF HIP, LEFT: ICD-10-CM

## 2018-06-18 DIAGNOSIS — G89.29 CHRONIC PAIN OF RIGHT KNEE: ICD-10-CM

## 2018-06-18 DIAGNOSIS — M25.561 CHRONIC PAIN OF RIGHT KNEE: ICD-10-CM

## 2018-06-18 PROCEDURE — 99999 PR PBB SHADOW E&M-EST. PATIENT-LVL III: CPT | Mod: PBBFAC,,, | Performed by: ORTHOPAEDIC SURGERY

## 2018-06-18 PROCEDURE — 99213 OFFICE O/P EST LOW 20 MIN: CPT | Mod: PBBFAC,PN | Performed by: ORTHOPAEDIC SURGERY

## 2018-06-18 PROCEDURE — 99024 POSTOP FOLLOW-UP VISIT: CPT | Mod: ,,, | Performed by: ORTHOPAEDIC SURGERY

## 2018-06-18 NOTE — PROGRESS NOTES
"Subjective:      Patient ID: Perri Youngblood is a 53 y.o. female.    Chief Complaint: Pain and Post-op Evaluation of the Left Hip      HPI: Perri Youngblood is here for post-op appointment. She is 9 weeks s/p left total hip replacement. She is doing excellent. She has minimal pain. She walks independently and without a limp. She does complain of mild right hip and knee pain that started recently. She has known right knee arthritis.     Past Medical History:   Diagnosis Date    Arthritis     May-Thurner syndrome     Osteopenia of lumbar spine     Osteoporosis     Peripheral vascular disease     Tachycardia        Current Outpatient Prescriptions:     alendronate (FOSAMAX) 70 MG tablet, Take by mouth every 7 days. , Disp: , Rfl:     aspirin (ECOTRIN) 81 MG EC tablet, Take 81 mg by mouth once daily., Disp: , Rfl:     meloxicam (MOBIC) 15 MG tablet, Take 1 tablet (15 mg total) by mouth once daily., Disp: 30 tablet, Rfl: 2    metoprolol succinate (TOPROL-XL) 25 MG 24 hr tablet, Take 25 mg by mouth once daily., Disp: , Rfl:     oxybutynin (DITROPAN-XL) 10 MG 24 hr tablet, Take 1 tablet (10 mg total) by mouth once daily., Disp: 30 tablet, Rfl: 11    oxyCODONE-acetaminophen (PERCOCET) 5-325 mg per tablet, 1 po qhs prn pain, Disp: 15 tablet, Rfl: 0    pravastatin (PRAVACHOL) 40 MG tablet, Take 40 mg by mouth once daily. Takes for 10 days off for 10 days, Disp: , Rfl:     Current Facility-Administered Medications:     sodium chloride 0.9% flush 3 mL, 3 mL, Intravenous, PRN, Papa Crisostomo MD  Review of patient's allergies indicates:   Allergen Reactions    Adhesive Rash    Bactrim [sulfamethoxazole-trimethoprim] Rash    Sulfa (sulfonamide antibiotics) Hives       Ht 5' 7" (1.702 m)   Wt 81.2 kg (179 lb)   BMI 28.04 kg/m²     Review of Systems   Constitution: Negative for chills and fever.   Cardiovascular: Negative for chest pain and palpitations.   Respiratory: Negative for shortness of " breath and wheezing.    Skin: Negative for poor wound healing and rash.   Musculoskeletal: Positive for arthritis and joint pain. Negative for muscle weakness and stiffness.   Gastrointestinal: Negative for nausea and vomiting.   Neurological: Negative for seizures and tremors.   Psychiatric/Behavioral: Negative for altered mental status.         Objective:    Ortho Exam   LEFT HIP EXAM  The patient is not in acute distress.   Body habitus is::normal.   The patient walks without a limp.   The skin over the hip is::has a healed scar.   There is::no local tenderness.  Range of motion- Flexion 120, External rotation 30, internal rotation 30.  Pain with rotation negative  Pulses DP present, PT present.  Motor normal 5/5 strength in all tested muscle groups.   Sensory normal.    GEN: Well developed, well nourished female. AAOX3. No acute distress.   Breathing unlabored.  Mood and affect normal.     Assessment:     Imaging: I reviewed the post-surgical imaging from 4/30/18        1. S/P hip replacement, left    2. Chronic pain of right knee    3. Avascular necrosis of bone of hip, left    Progressing very well      Plan:     I have ordered an Xray to evaluate right knee. Pt has had previous knee injections that have helped. Will see pt after xray for treatment.   Continue regular daily activities.   Orders Placed This Encounter    X-ray Knee Ortho Right     Follow-up in about 3 weeks (around 7/9/2018) for 3 month post-op visit with Dr. Crisostomo. .  And follow-up with me after knee x-ray.

## 2018-07-09 ENCOUNTER — TELEPHONE (OUTPATIENT)
Dept: ORTHOPEDICS | Facility: CLINIC | Age: 53
End: 2018-07-09

## 2018-07-09 ENCOUNTER — OFFICE VISIT (OUTPATIENT)
Dept: ORTHOPEDICS | Facility: CLINIC | Age: 53
End: 2018-07-09
Payer: MEDICAID

## 2018-07-09 VITALS
HEIGHT: 67 IN | DIASTOLIC BLOOD PRESSURE: 76 MMHG | WEIGHT: 179 LBS | BODY MASS INDEX: 28.09 KG/M2 | HEIGHT: 67 IN | WEIGHT: 179 LBS | BODY MASS INDEX: 28.09 KG/M2 | SYSTOLIC BLOOD PRESSURE: 115 MMHG

## 2018-07-09 DIAGNOSIS — M25.552 LEFT HIP PAIN: ICD-10-CM

## 2018-07-09 DIAGNOSIS — M17.11 PRIMARY OSTEOARTHRITIS OF RIGHT KNEE: Primary | ICD-10-CM

## 2018-07-09 DIAGNOSIS — Z96.642 S/P HIP REPLACEMENT, LEFT: Primary | ICD-10-CM

## 2018-07-09 DIAGNOSIS — Z87.81 S/P LEFT HIP FRACTURE: Primary | ICD-10-CM

## 2018-07-09 DIAGNOSIS — M25.551 PAIN OF RIGHT HIP JOINT: ICD-10-CM

## 2018-07-09 PROCEDURE — 99213 OFFICE O/P EST LOW 20 MIN: CPT | Mod: PBBFAC,PN | Performed by: ORTHOPAEDIC SURGERY

## 2018-07-09 PROCEDURE — 99999 PR PBB SHADOW E&M-EST. PATIENT-LVL III: CPT | Mod: PBBFAC,,, | Performed by: ORTHOPAEDIC SURGERY

## 2018-07-09 PROCEDURE — 20610 DRAIN/INJ JOINT/BURSA W/O US: CPT | Mod: S$PBB,79,RT, | Performed by: ORTHOPAEDIC SURGERY

## 2018-07-09 PROCEDURE — 20610 DRAIN/INJ JOINT/BURSA W/O US: CPT | Mod: PBBFAC,PN | Performed by: ORTHOPAEDIC SURGERY

## 2018-07-09 PROCEDURE — 99213 OFFICE O/P EST LOW 20 MIN: CPT | Mod: PBBFAC,27,PN,25 | Performed by: ORTHOPAEDIC SURGERY

## 2018-07-09 PROCEDURE — 99024 POSTOP FOLLOW-UP VISIT: CPT | Mod: S$PBB,,, | Performed by: ORTHOPAEDIC SURGERY

## 2018-07-09 PROCEDURE — 99213 OFFICE O/P EST LOW 20 MIN: CPT | Mod: S$PBB,24,25, | Performed by: ORTHOPAEDIC SURGERY

## 2018-07-09 RX ORDER — TRIAMCINOLONE ACETONIDE 40 MG/ML
40 INJECTION, SUSPENSION INTRA-ARTICULAR; INTRAMUSCULAR
Status: COMPLETED | OUTPATIENT
Start: 2018-07-09 | End: 2018-07-09

## 2018-07-09 RX ADMIN — TRIAMCINOLONE ACETONIDE 40 MG: 40 INJECTION, SUSPENSION INTRA-ARTICULAR; INTRAMUSCULAR at 02:07

## 2018-07-09 NOTE — PROGRESS NOTES
"Subjective:      Patient ID: Perri Youngblood is a 53 y.o. female.    Chief Complaint: Post-op Evaluation of the Left Hip      HPI: Perri Youngblood is here today with complaints of right knee pain They have experienced problems with their right knee over the past 5 months. The patient denies relevant history of injury/aggravation. Pain is located medially. Associated symptoms include giving way and pseudolocking. They have been treated with over the counter analgesics and NSAIDS.   Symptoms have recently stayed the same. Ambulation reportedly has not been impaired. Self care ADLs are not painful.     Past Medical History:   Diagnosis Date    Arthritis     May-Thurner syndrome     Osteopenia of lumbar spine     Osteoporosis     Peripheral vascular disease     Tachycardia        Current Outpatient Prescriptions:     alendronate (FOSAMAX) 70 MG tablet, Take by mouth every 7 days. , Disp: , Rfl:     aspirin (ECOTRIN) 81 MG EC tablet, Take 81 mg by mouth once daily., Disp: , Rfl:     meloxicam (MOBIC) 15 MG tablet, Take 1 tablet (15 mg total) by mouth once daily., Disp: 30 tablet, Rfl: 2    metoprolol succinate (TOPROL-XL) 25 MG 24 hr tablet, Take 25 mg by mouth once daily., Disp: , Rfl:     oxybutynin (DITROPAN-XL) 10 MG 24 hr tablet, Take 1 tablet (10 mg total) by mouth once daily., Disp: 30 tablet, Rfl: 11    oxyCODONE-acetaminophen (PERCOCET) 5-325 mg per tablet, 1 po qhs prn pain, Disp: 15 tablet, Rfl: 0    pravastatin (PRAVACHOL) 40 MG tablet, Take 40 mg by mouth once daily. Takes for 10 days off for 10 days, Disp: , Rfl:     Current Facility-Administered Medications:     sodium chloride 0.9% flush 3 mL, 3 mL, Intravenous, PRN, Papa Crisostomo MD  Review of patient's allergies indicates:   Allergen Reactions    Adhesive Rash    Bactrim [sulfamethoxazole-trimethoprim] Rash    Sulfa (sulfonamide antibiotics) Hives       Ht 5' 7" (1.702 m)   Wt 81.2 kg (179 lb)   BMI 28.04 kg/m² "     ROS      Objective:     There were no vitals filed for this visit. Ortho Exam     right KNEE:  The patient is not in acute distress.   Body habitus is normal.   The patient walks with a mild limp.  That she is also status post hip replacement.  Resisted SLR negative.   The skin over the knee is intact.  Knee effusion none   Tendernes is located medial and anterior  Range of motion- Flexion 145 deg, Extension 0 deg,   Ligament exam:   MCL intact   Lachman intact              Post sag intact    LCL intact  Patellar apprehension positive.  Popliteal cyst negative  Patellar crepitation present.  Flexion/pinch negative.  Pulses DP present, PT present.  Motor no muscle wasting or atrophy.   Sensory normal.    GEN: Well developed, well nourished female. AAOX3. No acute distress.   Breathing unlabored.  Mood and affect normal.         Assessment:     Imaging:  I personally reviewed the radiographs of the right knee moderate joint space narrowing and degenerative changes and osteophytes noted.        1. Primary osteoarthritis of right knee          Plan:     I recommended and performed injection of the right knee.  Restart meloxicam, which she has at home.  Knee brace for stability. Brace was fitted and provided in clinic.     Orders Placed This Encounter    triamcinolone acetonide injection 40 mg     Follow-up in about 3 months (around 10/9/2018).

## 2018-07-09 NOTE — PROCEDURES
Procedures   PROCEDURE:  I have explained the risks, benefits, and alternatives of the procedure in detail.  The patient voices understanding and all questions have been answered.  The patient agrees to proceed as planned. So after I performed a sterile prep of the skin in the normal fashion the right knee is injected using a 21 gauge needle with a combination of 1cc 1% plain xylocaine and 40mg triamcinolone.  The patient is cautioned that immediate relief of pain is secondary to the local anesthetic and will be temporary. After the anesthetic wears off there may be a increase in pain that may last for a few hours or a few days and they should use ice to help alleviate this this pain. Patient tolerated the procedure well.

## 2018-07-09 NOTE — PROGRESS NOTES
"Subjective:      Patient ID: Perri Youngblood is a 53 y.o. female.    Chief Complaint: Post-op Evaluation of the Left Hip   Three month follow-up    HPI:  The patient is seen for postop follow-up of left  MEGHA.  Pain control has been satisfactory  They feel that they are ambulating easily  Preoperative complaints include: na      Current Outpatient Prescriptions:     alendronate (FOSAMAX) 70 MG tablet, Take by mouth every 7 days. , Disp: , Rfl:     aspirin (ECOTRIN) 81 MG EC tablet, Take 81 mg by mouth once daily., Disp: , Rfl:     meloxicam (MOBIC) 15 MG tablet, Take 1 tablet (15 mg total) by mouth once daily., Disp: 30 tablet, Rfl: 2    metoprolol succinate (TOPROL-XL) 25 MG 24 hr tablet, Take 25 mg by mouth once daily., Disp: , Rfl:     oxybutynin (DITROPAN-XL) 10 MG 24 hr tablet, Take 1 tablet (10 mg total) by mouth once daily., Disp: 30 tablet, Rfl: 11    oxyCODONE-acetaminophen (PERCOCET) 5-325 mg per tablet, 1 po qhs prn pain, Disp: 15 tablet, Rfl: 0    pravastatin (PRAVACHOL) 40 MG tablet, Take 40 mg by mouth once daily. Takes for 10 days off for 10 days, Disp: , Rfl:     Current Facility-Administered Medications:     sodium chloride 0.9% flush 3 mL, 3 mL, Intravenous, PRN, Papa Crisostomo MD  Review of patient's allergies indicates:   Allergen Reactions    Adhesive Rash    Bactrim [sulfamethoxazole-trimethoprim] Rash    Sulfa (sulfonamide antibiotics) Hives       /76   Ht 5' 7" (1.702 m)   Wt 81.2 kg (179 lb)   BMI 28.04 kg/m²     ROS        Objective:    Ortho Exam          Alert, oriented, no acute distress  non-ataxic  wound margins intact and healing well.  No signs of infection.  Range of motion: is fluid and painless  none    Assessment:     Imaging: na        1. S/P hip replacement, left        Normal postop progress        Plan:          No Follow-up on file.    Appropriate precautions for this stage in activities reviewed x-ray an annual follow-up        "

## 2018-08-10 RX ORDER — MELOXICAM 15 MG/1
TABLET ORAL
Qty: 30 TABLET | Refills: 2 | Status: SHIPPED | OUTPATIENT
Start: 2018-08-10 | End: 2018-10-15

## 2018-09-07 ENCOUNTER — TELEPHONE (OUTPATIENT)
Dept: SPINE | Facility: CLINIC | Age: 53
End: 2018-09-07

## 2018-09-07 NOTE — TELEPHONE ENCOUNTER
----- Message from Alicia Lima sent at 9/7/2018  1:22 PM CDT -----  Name of Who is Calling:REY BARAHONA [[id number]]    What is the request in detail: Pt would like to schedule an appt. Tried to schedule, schedule not available. in [facility].       [facility]  Yes       What Number to Call Back [facility] not [facility]: #[phone]

## 2018-10-15 ENCOUNTER — OFFICE VISIT (OUTPATIENT)
Dept: ORTHOPEDICS | Facility: CLINIC | Age: 53
End: 2018-10-15
Payer: MEDICAID

## 2018-10-15 VITALS — HEIGHT: 67 IN | BODY MASS INDEX: 28.09 KG/M2 | WEIGHT: 179 LBS

## 2018-10-15 DIAGNOSIS — M79.671 FOOT PAIN, RIGHT: ICD-10-CM

## 2018-10-15 DIAGNOSIS — M17.11 PRIMARY OSTEOARTHRITIS OF RIGHT KNEE: ICD-10-CM

## 2018-10-15 DIAGNOSIS — M16.11 PRIMARY OSTEOARTHRITIS OF RIGHT HIP: Primary | ICD-10-CM

## 2018-10-15 PROCEDURE — 99999 PR PBB SHADOW E&M-EST. PATIENT-LVL III: CPT | Mod: PBBFAC,,, | Performed by: ORTHOPAEDIC SURGERY

## 2018-10-15 PROCEDURE — 99213 OFFICE O/P EST LOW 20 MIN: CPT | Mod: PBBFAC,PN | Performed by: ORTHOPAEDIC SURGERY

## 2018-10-15 PROCEDURE — 99213 OFFICE O/P EST LOW 20 MIN: CPT | Mod: S$PBB,,, | Performed by: ORTHOPAEDIC SURGERY

## 2018-10-15 RX ORDER — MELOXICAM 15 MG/1
15 TABLET ORAL DAILY
Qty: 90 TABLET | Refills: 0 | Status: SHIPPED | OUTPATIENT
Start: 2018-10-15 | End: 2019-06-18 | Stop reason: SDUPTHER

## 2018-10-15 NOTE — PROGRESS NOTES
Subjective:      Patient ID: Perri Youngblood is a 53 y.o. female.    Chief Complaint: Follow-up of the Right Knee      HPI: Perri Youngblood is here in follow-up of right knee pain. She was seen and treated 2 months ago with a hinged knee brace and corticosteroid injection of the right knee. Today she reports only 3 days of pain relief from the injection. She now complains of right groin pain that radiates down her thigh and into her knee. She states this is similar to the left sided hip pain she felt prior to left MEGHA. Ambulation and ADLs are painful but not impaired. She denies any h/o injury or aggravation. She uses Meloxicam with moderate relief of symptoms.     Of note: She is 6 moths s/p left total hip replacement and has done excellently post-op.     Past Medical History:   Diagnosis Date    Arthritis     May-Thurner syndrome     Osteopenia of lumbar spine     Osteoporosis     Peripheral vascular disease     Tachycardia        Current Outpatient Medications:     alendronate (FOSAMAX) 70 MG tablet, Take by mouth every 7 days. , Disp: , Rfl:     aspirin (ECOTRIN) 81 MG EC tablet, Take 81 mg by mouth once daily., Disp: , Rfl:     metoprolol succinate (TOPROL-XL) 25 MG 24 hr tablet, Take 25 mg by mouth once daily., Disp: , Rfl:     oxybutynin (DITROPAN-XL) 10 MG 24 hr tablet, Take 1 tablet (10 mg total) by mouth once daily., Disp: 30 tablet, Rfl: 11    oxyCODONE-acetaminophen (PERCOCET) 5-325 mg per tablet, 1 po qhs prn pain, Disp: 15 tablet, Rfl: 0    pravastatin (PRAVACHOL) 40 MG tablet, Take 40 mg by mouth once daily. Takes for 10 days off for 10 days, Disp: , Rfl:     meloxicam (MOBIC) 15 MG tablet, Take 1 tablet (15 mg total) by mouth once daily., Disp: 90 tablet, Rfl: 0    Current Facility-Administered Medications:     sodium chloride 0.9% flush 3 mL, 3 mL, Intravenous, PRN, Papa Crisostomo MD  Review of patient's allergies indicates:   Allergen Reactions    Adhesive Rash     "Bactrim [sulfamethoxazole-trimethoprim] Rash    Sulfa (sulfonamide antibiotics) Hives       Ht 5' 7" (1.702 m)   Wt 81.2 kg (179 lb)   BMI 28.04 kg/m²     Review of Systems   Constitution: Negative for chills and fever.   Cardiovascular: Negative for chest pain and palpitations.   Respiratory: Negative for shortness of breath and wheezing.    Skin: Negative for poor wound healing and rash.   Musculoskeletal: Positive for arthritis and joint pain. Negative for falls.   Gastrointestinal: Negative for nausea and vomiting.   Genitourinary: Negative for dysuria and hematuria.   Neurological: Negative for seizures and tremors.   Psychiatric/Behavioral: Negative for altered mental status.   Allergic/Immunologic: Negative for environmental allergies and persistent infections.         Objective:    Ortho Exam       Right KNEE:  The patient is not in acute distress.   Body habitus is normal.   The patient walks with a mild limp.    The skin over the knee is intact.  Knee effusion none.  Tendernes is located medial and anterior  Range of motion- Flexion 145 deg, Extension 0 deg,   Patellar apprehension positive.  Popliteal cyst negative  Patellar crepitation present.  Pulses DP present, PT present.  Motor no muscle wasting or atrophy.   Sensory normal.  RIGHT HIP EXAM  The skin over the hip is::intact.   There is:no local tenderness.  Range of motion- is full  Mild pain with rotation     GEN: Well developed, well nourished female. AAOX3. No acute distress.   Normocephalic, atraumatic.   MALA  Breathing unlabored.  Mood and affect appropriate.       Assessment:     Imaging: I personally reviewed and interpreted the right knee radiographs which show moderate tricompartmental joint space narrowing.     I personally reviewed and interpreted the hip radiograph from 4/30/18 reveals moderate joint space loss of the right hip.        1. Primary osteoarthritis of right hip    2. Foot pain, right    3. Primary osteoarthritis of " right knee          Plan:       I explained to the pt she has degenerative changes in both her right hip and right knee.   Given her h/o left total hip replacement, my impression is the pain is likely originating in the hip rather than the knee.  Pt does well with Meloxicam. I will refill this today.  Repeat right hip xray at f/u visit with Dr. Crisostomo.  I explained the potential role of surgery in the treatment of this condition, which she understands well. If non surgical measures do not adequately control symptoms, surgery will be considered in the future. The pt is open to surgical treatment in the near future if neccessary.    Unrelated, she has pain and swelling of the right foot and has requested referral to podiatry.         Orders Placed This Encounter    Ambulatory Referral to Podiatry    meloxicam (MOBIC) 15 MG tablet     Follow-up in about 4 weeks (around 11/12/2018) for f/u with Dr. Crisostomo.

## 2018-10-25 ENCOUNTER — TELEPHONE (OUTPATIENT)
Dept: PODIATRY | Facility: CLINIC | Age: 53
End: 2018-10-25

## 2018-10-25 DIAGNOSIS — M79.672 PAIN IN BOTH FEET: Primary | ICD-10-CM

## 2018-10-25 DIAGNOSIS — M79.671 PAIN IN BOTH FEET: Primary | ICD-10-CM

## 2018-10-26 ENCOUNTER — OFFICE VISIT (OUTPATIENT)
Dept: PODIATRY | Facility: CLINIC | Age: 53
End: 2018-10-26
Payer: MEDICAID

## 2018-10-26 VITALS
HEART RATE: 68 BPM | WEIGHT: 178 LBS | RESPIRATION RATE: 16 BRPM | HEIGHT: 67 IN | DIASTOLIC BLOOD PRESSURE: 75 MMHG | BODY MASS INDEX: 27.94 KG/M2 | SYSTOLIC BLOOD PRESSURE: 132 MMHG

## 2018-10-26 DIAGNOSIS — M20.5X2 HALLUX LIMITUS OF LEFT FOOT: ICD-10-CM

## 2018-10-26 DIAGNOSIS — M20.20 ACQUIRED HALLUX RIGIDUS, UNSPECIFIED LATERALITY: Primary | ICD-10-CM

## 2018-10-26 DIAGNOSIS — M20.5X1 CROSSOVER TOE DEFORMITY OF RIGHT FOOT: ICD-10-CM

## 2018-10-26 DIAGNOSIS — S99.921A INJURY OF PLANTAR PLATE OF RIGHT FOOT, INITIAL ENCOUNTER: ICD-10-CM

## 2018-10-26 PROCEDURE — 99213 OFFICE O/P EST LOW 20 MIN: CPT | Mod: PBBFAC,PN | Performed by: PODIATRIST

## 2018-10-26 PROCEDURE — 99999 PR PBB SHADOW E&M-EST. PATIENT-LVL III: CPT | Mod: PBBFAC,,, | Performed by: PODIATRIST

## 2018-10-26 PROCEDURE — 99203 OFFICE O/P NEW LOW 30 MIN: CPT | Mod: S$PBB,,, | Performed by: PODIATRIST

## 2018-10-26 NOTE — LETTER
October 26, 2018      Lashae Ellis PA-C  [address] [place] [address]  [address]  [facility] [address]           [place] [address] [address] , [address]   [facility] [address]  Phone: [phone]  Fax: [phone]          Patient: Perri Youngblood   MR Number: [id number]   YOB: 1965   Date of Visit: 10/26/2018       Dear Lashae Ellis:    Thank you for referring Perri Youngblood to me for evaluation. Attached you will find relevant portions of my assessment and plan of care.    If you have questions, please do not hesitate to call me. I look forward to following Perri Youngblood along with you.    Sincerely,    Rukhsana Kelly, LEONIDES    Enclosure  CC:  No Recipients    If you would like to receive this communication electronically, please contact [email or web address] or [phone] to request more information on [facility] access.    For providers and/or their staff who would like to refer a patient to [facility], please contact us through our one-stop-shop provider referral line, [facility], at [phone].    If you feel you have received this communication in error or would no longer like to receive these types of communications, please e-mail [email or web address]

## 2018-10-26 NOTE — PROGRESS NOTES
"Subjective:      Patient ID: Perri Youngblood is a 53 y.o. female.    Chief Complaint: Foot Pain (bilateral, right foot hurts worse )- we focused on the right foot because it hurts a lot more than left foot. We will address left foot after addressing right foot.     Presenting with bilateral 1st MPJ pain. Right foot is a lot worse than left foot. She also having pain at the 2nd MPJ. It is associated with swelling after prolong walking and ambulation. She also had left hip replacement in April which she is doing really well. She is seeing Dr. Becker for right hip. She tell me she has OA of the right knee as well. She will get xrays for hips soon.     She has been having pain at the 1st MPJ for "a long time" it recently started bothering her. Pain starts after ambulation and gets better with resting and elevation. Compression socks helps with swelling. Describes the pain 8/10 throbbing. Denies other pedal issues.       Review of Systems   Constitution: Negative for decreased appetite, fever, weakness and malaise/fatigue.   HENT: Negative for congestion.    Cardiovascular: Negative for chest pain and leg swelling.   Respiratory: Negative for cough and shortness of breath.    Skin: Negative for color change, nail changes and rash.   Musculoskeletal: Positive for arthritis, joint pain, joint swelling and stiffness. Negative for muscle weakness.   Gastrointestinal: Negative for bloating, abdominal pain, nausea and vomiting.   Neurological: Negative for headaches and numbness.   Psychiatric/Behavioral: Negative for altered mental status.     Vitals:    10/26/18 1039   BP: 132/75   Pulse: 68   Resp: 16   Weight: 80.7 kg (178 lb)   Height: 5' 7" (1.702 m)   PainSc:   8   PainLoc: Foot           Objective:      Physical Exam   Constitutional: She is oriented to person, place, and time. She appears well-developed and well-nourished. No distress.   Musculoskeletal: She exhibits edema, tenderness and deformity. "   Neurological: She is alert and oriented to person, place, and time.   Skin: Skin is warm and dry. Capillary refill takes 2 to 3 seconds.   Psychiatric: She has a normal mood and affect.     Vascular: Distal DP/PT pulses palpable 2/4 b/l. CRT < 3 sec to tips of toes. No vericosities noted to b/l LEs. Hair growth present b/l LE, warm to touch b/l LE  R foot: mild edema over right 1st MPJ  L foot: mild edema over right 1st MPJ    Dermatologic: No open lesions, lacerations or wounds. Nails are normal R 1-5 and L 1-5. +spider veins b/l. Interdigital spaces clean, dry and intact b/l. No erythema, rubor, calor noted to b/l LE  R foot: pre ulcerative lesion at 2nd MPJ    Musculoskeletal: Equinus noted b/l ankles with < 10 deg DF noted b/l. MMT 5/5 in DF/PF/Inv/Ev resistance with no reproduction of pain in any direction. Passive range of motion of ankle and pedal joints is painless b/l. No calf tenderness b/l LE, Compartments soft/compressible b/l.   R foot: pain over 1st MPJ. Limited ROM at 1st MPJ. +crepitus, 2nd toe overlapping hallux, pain at 2nd MPJ, +lachman at 2nd MPJ    Neurological: Light touch, proprioception, and sharp/dull sensation are all intact b/l. Protective threshold with the [place]-Wienstein monofilament is intact b/l. Vibratory sensation intact b/l.           Assessment:       Encounter Diagnoses   Name Primary?    Acquired hallux rigidus, unspecified laterality - Right Foot Yes    Crossover toe deformity of right foot     Hallux limitus of left foot     Injury of plantar plate of right foot, initial encounter          Plan:       Perri was seen today for foot pain.    Diagnoses and all orders for this visit:    Acquired hallux rigidus, unspecified laterality - Right Foot    Crossover toe deformity of right foot    Hallux limitus of left foot    Injury of plantar plate of right foot, initial encounter      I counseled the patient on her conditions, their implications and medical management.     -53  y/o female with 1. R hallux rigidus 2. R 2nd MPJ plantar plate attenuation 3. R 2nd crossover toe 4. L hallux limitus     -Xrays foot b/l evaluated with patient: R foot: There is moderate loss of joint space at the 1st metatarsophalangeal joint on the right.  There is no fracture, dislocation, or bony erosion.  -R hallux rigidus, 2nd MPJ pain along with plantar plate pathology: I told her different options conservatively vs surgically. I told her that deformity might stay as it is or might get worse, but it will not get better unless correcting it surgically.   -will address left foot problem once taking care of right foot  -pt will follow up with Dr. Becker for hips.  -she will make an appointment to discuss surgical options after sorting out the issues with hips.  -f/u prn  -The nature of the condition, options for management, as well as potential risks and complications were discussed in detail with patient. Patient was amenable to my recommendations and left my office fully informed and will follow up as instructed or sooner if necessary.

## 2018-10-31 ENCOUNTER — HOSPITAL ENCOUNTER (OUTPATIENT)
Dept: RADIOLOGY | Facility: HOSPITAL | Age: 53
Discharge: HOME | End: 2018-10-31
Attending: FAMILY MEDICINE
Payer: MEDICAID

## 2018-10-31 DIAGNOSIS — M81.0 OP (OSTEOPOROSIS): ICD-10-CM

## 2018-10-31 PROCEDURE — 77080 DXA BONE DENSITY AXIAL: CPT | Mod: 26,,, | Performed by: RADIOLOGY

## 2018-10-31 PROCEDURE — 77080 DXA BONE DENSITY AXIAL: CPT | Mod: TC

## 2018-11-09 ENCOUNTER — TELEPHONE (OUTPATIENT)
Dept: ORTHOPEDICS | Facility: CLINIC | Age: 53
End: 2018-11-09

## 2018-11-09 NOTE — TELEPHONE ENCOUNTER
Left message for patient in regards to appt on NOV 13, 2018 . I explained time , date , and location .

## 2018-11-13 ENCOUNTER — TELEPHONE (OUTPATIENT)
Dept: ORTHOPEDICS | Facility: CLINIC | Age: 53
End: 2018-11-13

## 2018-11-13 ENCOUNTER — OFFICE VISIT (OUTPATIENT)
Dept: ORTHOPEDICS | Facility: CLINIC | Age: 53
End: 2018-11-13
Payer: MEDICAID

## 2018-11-13 VITALS — HEIGHT: 67 IN | WEIGHT: 178 LBS | BODY MASS INDEX: 27.94 KG/M2

## 2018-11-13 DIAGNOSIS — M16.11 PRIMARY OSTEOARTHRITIS OF RIGHT HIP: Primary | ICD-10-CM

## 2018-11-13 DIAGNOSIS — Z96.641 S/P HIP REPLACEMENT, RIGHT: Primary | ICD-10-CM

## 2018-11-13 PROCEDURE — 99212 OFFICE O/P EST SF 10 MIN: CPT | Mod: PBBFAC,PN | Performed by: ORTHOPAEDIC SURGERY

## 2018-11-13 PROCEDURE — 99214 OFFICE O/P EST MOD 30 MIN: CPT | Mod: S$PBB,,, | Performed by: ORTHOPAEDIC SURGERY

## 2018-11-13 PROCEDURE — 99999 PR PBB SHADOW E&M-EST. PATIENT-LVL II: CPT | Mod: PBBFAC,,, | Performed by: ORTHOPAEDIC SURGERY

## 2018-11-13 NOTE — PROGRESS NOTES
Subjective:      Patient ID: Perri Youngblood is a 53 y.o. female.    Chief Complaint:  Right hip pain  HPI the patient complains of increasing right hip pain starting in the groin and radiating to the medial side of the thigh and knee. It is worse with walking and rotating the extremity.  She has tried oral medication and activity modification without relief.  Her left total hip continues to be comfortable.    Review of Systems   Constitution: Negative for fever and weight loss.   HENT: Negative for congestion.    Eyes: Negative for visual disturbance.   Cardiovascular: Negative for chest pain.   Respiratory: Negative for shortness of breath.    Hematologic/Lymphatic: Negative for bleeding problem. Does not bruise/bleed easily.   Skin: Negative for poor wound healing.   Musculoskeletal: Positive for joint pain.   Gastrointestinal: Negative for abdominal pain.   Genitourinary: Negative for dysuria.   Neurological: Negative for seizures.   Psychiatric/Behavioral: Negative for altered mental status.   Allergic/Immunologic: Negative for persistent infections.         Objective:            Ortho/SPM Exam  Right hip    The patient is not in acute distress.   Body habitus is:normal.   Sclerae normal  The patient walks with a limp.   none  The skin over the hip is:intact.   There is:no local tenderness.   Range of motion- Flexion 85, External rotation 20, internal rotation 15.  Resisted SLR positive.  Pain with rotation positive  Sciatic tension findings negative.  Shortening/lengthening compared to the contralateral side exam deferred.  Pulses DP present, PT present.  Motor normal 5/5 strength in all tested muscle groups.   Sensory normal.    I reviewed the relevant radiographic images for the patient's condition:  Previous right hip films show loss of joint space with small osteophytes        Assessment:       Encounter Diagnosis   Name Primary?    Primary osteoarthritis of right hip Yes          The condition is  clinically progressive, limits normal ADLs and has not responded to oral medication and activity modification.    Plan:       Perri was seen today for pain.    Diagnoses and all orders for this visit:    Primary osteoarthritis of right hip      I explained my diagnostic impression and the reasoning behind it in detail, using layman's terms.  Models and/or pictures were used to help in the explanation.     right total hip replacement was explained to the patient.  The nature of the procedure was explained using a model.  The expected perioperative clinical course and period of recovery as applicable to the patient's condition was described.  The risks including death, infection, thromboembolic events, instability, leg length discrepancy, persistent pain/stiffness and implant failure due to wear or loosening were all explained.  The possibility and expectations of continued nonsurgical care were reviewed.  The patient understands and wishes to proceed with the recommended operation.

## 2018-11-13 NOTE — PROGRESS NOTES
Subjective:      Patient ID: Perri Youngblood is a 53 y.o. female.    Chief Complaint:  Right hip pain  HPI the patient complains of progressive pain in the right groin radiating to the knee. No relief with activity modification and over-the-counter medication.  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    Review of Systems   Constitution: Negative for fever and weight loss.   HENT: Negative for congestion.    Eyes: Negative for visual disturbance.   Cardiovascular: Negative for chest pain.   Respiratory: Negative for shortness of breath.    Hematologic/Lymphatic: Negative for bleeding problem. Does not bruise/bleed easily.   Skin: Negative for poor wound healing.   Musculoskeletal: Positive for joint pain.   Gastrointestinal: Negative for abdominal pain.   Genitourinary: Negative for dysuria.   Neurological: Negative for seizures.   Psychiatric/Behavioral: Negative for altered mental status.   Allergic/Immunologic: Negative for persistent infections.         Objective:            Ortho/SPM Exam  The patient is not in acute distress.   Body habitus is:normal.   Sclerae normal  The patient walks with a limp.   none  The skin over the hip is:intact.   There is:no local tenderness.   Range of motion- Flexion 85, External rotation 20, internal rotation 10.  Resisted SLR positive.  Pain with rotation positive  Sciatic tension findings negative.  Shortening/lengthening compared to the contralateral side exam deferred.  Pulses DP present, PT present.  Motor normal 5/5 strength in all tested muscle groups.   Sensory normal.  I reviewed the relevant radiographic images for the patient's condition:  Previous right hip films document joint space narrowing with small osteophytes      Assessment:       Encounter Diagnosis   Name Primary?    Primary osteoarthritis of right hip Yes    the symptoms persist and progressed despite nonsurgical care. Patient is unable to perform reasonable ADLs      Plan:        Perri was seen today for pain.    Diagnoses and all orders for this visit:    Primary osteoarthritis of right hip      I explained my diagnostic impression and the reasoning behind it in detail, using layman's terms.  Models and/or pictures were used to help in the explanation.     right total hip replacement was explained to the patient.  The nature of the procedure was explained using a model.  The expected perioperative clinical course and period of recovery as applicable to the patient's condition was described.  The risks including death, infection, thromboembolic events, instability, leg length discrepancy, persistent pain/stiffness and implant failure due to wear or loosening were all explained.  The possibility and expectations of continued nonsurgical care were reviewed.  The patient understands and wishes to proceed with the recommended operation.

## 2018-11-13 NOTE — H&P
Subjective:      Patient ID: Perri Youngblood is a 53 y.o. female.     Chief Complaint:  Right hip pain    HPI the patient complains of progressive pain in the right groin radiating to the knee. No relief with activity modification and over-the-counter medication.  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      Past Medical History:   Diagnosis Date    Arthritis     May-Thurner syndrome     Osteopenia of lumbar spine     Osteoporosis     Peripheral vascular disease     Tachycardia      Past Surgical History:   Procedure Laterality Date    ARTHROGRAM L hip Corticosteroid injection  Left 1/3/2018    Performed by [facility] at [facility] OR 2ND FLR    ARTHROPLASTY-HIP Left 4/16/2018    Performed by Papa Crisostomo MD at [facility] OR    BLADDER SUSPENSION      CLOSED REDUCTION WRIST FRACTURE      FEMORAL ARTERY STENT      HYSTERECTOMY      INJECTION-STEROID  1/3/2018    Performed by [facility]o [facility] at [facility] OR 2ND FLR     Social History     Occupational History    Not on file   Tobacco Use    Smoking status: Never Smoker    Smokeless tobacco: Never Used   Substance and Sexual Activity    Alcohol use: No    Drug use: No    Sexual activity: Not on file      ROS  Current Outpatient Medications on File Prior to Visit   Medication Sig Dispense Refill    alendronate (FOSAMAX) 70 MG tablet Take by mouth every 7 days.       aspirin (ECOTRIN) 81 MG EC tablet Take 81 mg by mouth once daily.      meloxicam (MOBIC) 15 MG tablet Take 1 tablet (15 mg total) by mouth once daily. 90 tablet 0    metoprolol succinate (TOPROL-XL) 25 MG 24 hr tablet Take 25 mg by mouth once daily.      oxybutynin (DITROPAN-XL) 10 MG 24 hr tablet Take 1 tablet (10 mg total) by mouth once daily. 30 tablet 11    pravastatin (PRAVACHOL) 40 MG tablet Take 40 mg by mouth once daily. Takes for 10 days off for 10 days       Current Facility-Administered Medications on File Prior to Visit   Medication Dose  "Route Frequency Provider Last Rate Last Dose    sodium chloride 0.9% flush 3 mL  3 mL Intravenous PRN Papa Crisostomo MD         Review of patient's allergies indicates:   Allergen Reactions    Adhesive Rash    Bactrim [sulfamethoxazole-trimethoprim] Rash    Sulfa (sulfonamide antibiotics) Hives         Objective:      Vitals:    11/13/18 1053   Weight: 80.7 kg (178 lb)   Height: 5' 7" (1.702 m)     Ortho Exam     The patient is not in acute distress.   Body habitus is:normal.   Sclerae normal  No respiratory distress  Heart regular rate rhythm  Chest clear  The patient walks with a limp.   none  The skin over the hip is:intact.   There is:no local tenderness.   Range of motion- Flexion 85, External rotation 20, internal rotation 10.  Resisted SLR positive.  Pain with rotation positive  Sciatic tension findings negative.  Shortening/lengthening compared to the contralateral side exam deferred.  Pulses DP present, PT present.  Motor normal 5/5 strength in all tested muscle groups.   Sensory normal.  I reviewed the relevant radiographic images for the patient's condition:  Previous right hip films document joint space narrowing with small osteophytes            Assessment:       1. Primary osteoarthritis of right hip    the symptoms persist and progressed despite nonsurgical care. Patient is unable to perform reasonable ADLs          Plan:       I explained my diagnostic impression and the reasoning behind it in detail, using layman's terms.  Models and/or pictures were used to help in the explanation.      right total hip replacement was explained to the patient.  The nature of the procedure was explained using a model.  The expected perioperative clinical course and period of recovery as applicable to the patient's condition was described.  The risks including death, infection, thromboembolic events, instability, leg length discrepancy, persistent pain/stiffness and implant failure due to wear or loosening " were all explained.  The possibility and expectations of continued nonsurgical care were reviewed.  The patient understands and wishes to proceed with the recommended operation.

## 2018-11-14 ENCOUNTER — PATIENT MESSAGE (OUTPATIENT)
Dept: ORTHOPEDICS | Facility: CLINIC | Age: 53
End: 2018-11-14

## 2018-11-26 ENCOUNTER — OFFICE VISIT (OUTPATIENT)
Dept: SPINE | Facility: CLINIC | Age: 53
End: 2018-11-26
Attending: PHYSICAL MEDICINE & REHABILITATION
Payer: MEDICAID

## 2018-11-26 VITALS
WEIGHT: 178 LBS | HEIGHT: 67 IN | DIASTOLIC BLOOD PRESSURE: 76 MMHG | SYSTOLIC BLOOD PRESSURE: 120 MMHG | BODY MASS INDEX: 27.94 KG/M2 | HEART RATE: 74 BPM

## 2018-11-26 DIAGNOSIS — M16.11 PRIMARY OSTEOARTHRITIS OF RIGHT HIP: ICD-10-CM

## 2018-11-26 DIAGNOSIS — M51.36 DDD (DEGENERATIVE DISC DISEASE), LUMBAR: Primary | ICD-10-CM

## 2018-11-26 DIAGNOSIS — G89.29 CHRONIC BILATERAL LOW BACK PAIN WITHOUT SCIATICA: ICD-10-CM

## 2018-11-26 DIAGNOSIS — M54.50 CHRONIC BILATERAL LOW BACK PAIN WITHOUT SCIATICA: ICD-10-CM

## 2018-11-26 PROCEDURE — 99999 PR PBB SHADOW E&M-EST. PATIENT-LVL III: CPT | Mod: PBBFAC,,, | Performed by: PHYSICAL MEDICINE & REHABILITATION

## 2018-11-26 PROCEDURE — 99214 OFFICE O/P EST MOD 30 MIN: CPT | Mod: S$PBB,,, | Performed by: PHYSICAL MEDICINE & REHABILITATION

## 2018-11-26 PROCEDURE — 99213 OFFICE O/P EST LOW 20 MIN: CPT | Mod: PBBFAC | Performed by: PHYSICAL MEDICINE & REHABILITATION

## 2018-11-26 RX ORDER — MELOXICAM 15 MG/1
TABLET ORAL
COMMUNITY
End: 2018-11-26 | Stop reason: SDUPTHER

## 2018-11-26 NOTE — PROGRESS NOTES
Subjective:      Patient ID: Perri Youngblood is a 53 y.o. female.    Chief Complaint: Low-back Pain    Ms Youngblood is a 52 yo female here for follow up of low back and left leg pain.  She has had low back pain sine jan 2016 when she had a stent placed in her left leg.  The pain started 2 weeks after.  She was seen by me on 12/22/2017 and we did x-rays of left hip and discussed hip and need for hip evaluation.  She was also sent to PT.  She has had a left MEGHA 4/16/2018.  She has her right hip replacement scheduled on 12/17/2018.  She feels like the left hip surgery was very helpful.  The right hip pain started the same way.  She feels like she is having trouble with bladder stress incontinence and urgency.  She has had bladder fixed previously.  She is having pain across the lower back.  The pain is right hip and knee.  She is having left knee pain as well, and swelling.  The low back pain is worse with sitting and getting up from the chair.  The pain is not with getting out of bed.  Pain is 6/10 now, worst 10/10 carrying and lifting baby, best 5/10 lying down.      X-ray bilateral hips 7/2018  Interval postop left total hip prostatic surgery.  No hardware failure.  Moderate DJD changes right hip joint.  Endo vascular stent therapy left common iliac IVC junction stable.    Impression      New postop total left hip joint replacement surgery.    Moderate DJD changes right hip.  No fracture dislocation.    X-ray lumbar 12/2017  Lumbar spine 5 views with flexion and extension.    Findings: 7 views. There is mild levoscoliosis of lumbar spine. The lumbar spinal alignment and vertebral body heights are satisfactorily preserved. There is loss of disc space height and degenerative change identified from T12-L1 through L2-3. There is facet hypertrophy from L3-4 through L5-S1. There is no fracture, dislocation, or bone erosion. There is no instability upon flexion extension. There is a left iliac stent in  place.    EMG  BILATERAL L2-4 CHRONIC NEUROGENIC ATROPHY CONSISTENT WITH RADICULOPATHY  LEFT > > RIGHT    MRI lumbar spine 7/17/2017  Slight grade 1 retrolisthesis of L2 on L3. The vertebral body heights are well maintained, with no fracture.  No marrow signal abnormality suspicious for an infiltrative process.  Multilevel degenerative disc disease with desiccation and severe disc height loss with endplate changes, most notably at L2-3.    The conus is normal in appearance, and terminates at the L1-2 level. Partially-imaged exophytic, 1.4 cm fluid signal focus adjacent to the right kidney, likely a renal cyst. The adjacent soft tissue structures show no significant abnormalities.        L1-L2:  No significant spinal canal or neuroforaminal narrowing.    L2-L3: Circumferential disc bulge with a superimposed small right paracentral disc extrusion extending a few millimeters superiorly, and mild bilateral facet arthrosis result in mild spinal canal stenosis and mild right neuroforaminal narrowing.    L3-L4: Circumferential disc bulge, ligamentum flavum hypertrophy, and mild bilateral facet arthrosis contributes to moderate right and mild left neuroforaminal narrowing with possible impingement on the exiting right L3 nerve root. No spinal canal stenosis.    L4-L5: Circumferential disc bulge, ligamentum flavum hypertrophy, and mild bilateral facet arthrosis contributing mild right neuroforaminal narrowing. No significant spinal canal stenosis.    L5-S1: Small posterior disc protrusion and mild bilateral facet arthrosis without significant spinal canal stenosis or neuroforaminal narrowing.  Impression          Lumbar spondylosis, resulting in mild spinal canal stenosis at L2-3, and mild/moderate neuroforaminal narrowing at L2-3 and L3-4, as above.    Small disc extrusion at L2-3.        Reviewed EMG:  Conclusion:   BILATERAL L2-4 CHRONIC NEUROGENIC ATROPHY CONSISTENT WITH RADICULOPATHY   LEFT > > RIGHT      Diagnoses:                                                                                            L2-3 lumbar radiculopathy    No past medical history on file.    No past surgical history on file.    No family history on file.      Social History    Marital status:              Spouse name:                       Years of education:                 Number of children:               Social History Main Topics    Smoking status: Never Smoker                                                                Smokeless tobacco: Never Used                        Alcohol use: No                Current Outpatient Prescriptions:  alendronate (FOSAMAX) 70 MG tablet, , Disp: , Rfl:   aspirin (ECOTRIN) 81 MG EC tablet, Take 81 mg by mouth once daily., Disp: , Rfl:   gabapentin (NEURONTIN) 300 MG capsule, , Disp: , Rfl:   metoprolol succinate (TOPROL-XL) 25 MG 24 hr tablet, Take 25 mg by mouth once daily., Disp: , Rfl:   oxybutynin (DITROPAN-XL) 10 MG 24 hr tablet, Take 1 tablet (10 mg total) by mouth once daily., Disp: 30 tablet, Rfl: 11  pravastatin (PRAVACHOL) 40 MG tablet, Take 40 mg by mouth once daily., Disp: , Rfl:     No current facility-administered medications for this visit.       Review of patient's allergies indicates:   -- Bactrim (sulfamethoxazole-trimethoprim) -- Rash   -- Sulfa (sulfonamide antibiotics) -- Hives          Review of Systems   Constitution: Negative for weight gain and weight loss.   Cardiovascular: Negative for chest pain.   Respiratory: Negative for shortness of breath.    Musculoskeletal: Positive for back pain (right leg) and joint pain. Negative for joint swelling.   Gastrointestinal: Negative for abdominal pain and bowel incontinence.   Genitourinary: Positive for frequency and urgency. Negative for bladder incontinence.   Neurological: Negative for numbness.         Objective:        General: Perri is well-developed, well-nourished, appears stated age, in no acute distress, alert and oriented to  time, place and person.     General    Vitals reviewed.  Constitutional: She is oriented to person, place, and time. She appears well-developed and well-nourished.   HENT:   Head: Normocephalic and atraumatic.   Pulmonary/Chest: Effort normal.   Neurological: She is alert and oriented to person, place, and time.   Psychiatric: She has a normal mood and affect. Her behavior is normal. Judgment and thought content normal.     General Musculoskeletal Exam   Gait: abnormal (slightly antalgic)     Right Ankle/Foot Exam     Tests   Heel Walk: able to perform  Tiptoe Walk: able to perform    Left Ankle/Foot Exam     Tests   Heel Walk: able to perform  Tiptoe Walk: able to perform  Left Hip Exam     Tenderness   The patient tender to palpation of the psoas tendon.    Range of Motion   Extension: 0   Flexion: 80   External rotation: 50 (with leg pain)   Internal rotation: 20 (with leg pain)       Back (L-Spine & T-Spine) / Neck (C-Spine) Exam     Tenderness Right paramedian tenderness of the Lower L-Spine. Left paramedian tenderness of the Lower L-Spine.     Back (L-Spine & T-Spine) Range of Motion   Extension: 10   Flexion: 70   Lateral bend right: 20   Lateral bend left: 20   Rotation right: 40   Rotation left: 40     Spinal Sensation   Right Side Sensation  C-Spine Level: normal   L-Spine Level: normal  S-Spine Level: normal  Left Side Sensation  C-Spine Level: normal  L-Spine Level: normal  S-Spine Level: normal    Back (L-Spine & T-Spine) Tests   Right Side Tests  Straight leg raise:      Sitting SLR: > 70 degrees      Left Side Tests  Straight leg raise:     Sitting SLR: > 70 degrees          Other She has no scoliosis .  Spinal Kyphosis:  Absent      Muscle Strength   Right Upper Extremity   Biceps: 5/5/5   Deltoid:  5/5  Triceps:  5/5  Wrist extension: 5/5/5   Finger Flexors:  5/5  Left Upper Extremity  Biceps: 5/5/5   Deltoid:  5/5  Triceps:  5/5  Wrist extension: 5/5/5   Finger Flexors:  5/5  Right Lower Extremity    Hip Flexion: 5/5   Quadriceps:  5/5   Anterior tibial:  5/5/5  EHL:  5/5  Left Lower Extremity   Hip Flexion: 5/5   Quadriceps:  5/5   Anterior tibial:  5/5/5   EHL:  5/5    Reflexes     Left Side  Biceps:  2+  Triceps:  2+  Brachioradialis:  2+  Quadriceps:  2+  Achilles:  2+  Left Gutierrez's Sign:  Absent  Babinski Sign:  absent    Right Side   Biceps:  2+  Triceps:  2+  Brachioradialis:  2+  Quadriceps:  2+  Achilles:  2+  Right Gutierrez's Sign:  absent  Babinski Sign:  absent    Vascular Exam     Right Pulses        Carotid:                  2+    Left Pulses        Carotid:                  2+              Assessment:       1. DDD (degenerative disc disease), lumbar    2. Chronic bilateral low back pain without sciatica    3. Primary osteoarthritis of right hip           Plan:            1. Right MEGHA on 12/17/2018, we discussed that the limp can cause more back pain.    2.  Discuss fosamax with PCP  3.  PT for back strengthening, core strengthening, hip ROM and hip strengthening and HEP will be an option after MEGHA.  She is going to restart her HEP  4.  We can also consider SI joint injections after hip surgery  5.  RTC 4 months      Follow-up: Follow-up in about 4 months (around 3/26/2019). If there are any questions prior to this, the patient was instructed to contact the office.

## 2018-12-05 ENCOUNTER — TELEPHONE (OUTPATIENT)
Dept: ORTHOPEDICS | Facility: CLINIC | Age: 53
End: 2018-12-05

## 2018-12-05 DIAGNOSIS — M25.562 LEFT KNEE PAIN, UNSPECIFIED CHRONICITY: Primary | ICD-10-CM

## 2018-12-05 NOTE — TELEPHONE ENCOUNTER
Spoke with patient in regards to appt on 12/11/2018 for LT Knee pain . I explained time , date , and location . Verbalized understanding .  ----- Message from Rigo Pisano sent at 12/5/2018  8:24 AM CST -----  Contact: [phone]/self  Patient states she hurt her knee and she would like to know if the Dr can send orders for a xray.  Please call and advise

## 2018-12-11 ENCOUNTER — OFFICE VISIT (OUTPATIENT)
Dept: ORTHOPEDICS | Facility: CLINIC | Age: 53
End: 2018-12-11
Payer: MEDICAID

## 2018-12-11 ENCOUNTER — TELEPHONE (OUTPATIENT)
Dept: ORTHOPEDICS | Facility: CLINIC | Age: 53
End: 2018-12-11

## 2018-12-11 VITALS — WEIGHT: 178 LBS | HEIGHT: 67 IN | BODY MASS INDEX: 27.94 KG/M2

## 2018-12-11 DIAGNOSIS — M17.12 PRIMARY OSTEOARTHRITIS OF LEFT KNEE: Primary | ICD-10-CM

## 2018-12-11 DIAGNOSIS — M16.11 PRIMARY OSTEOARTHRITIS OF RIGHT HIP: Primary | ICD-10-CM

## 2018-12-11 PROCEDURE — 20610 DRAIN/INJ JOINT/BURSA W/O US: CPT | Mod: PBBFAC,PN | Performed by: ORTHOPAEDIC SURGERY

## 2018-12-11 PROCEDURE — 99999 PR PBB SHADOW E&M-EST. PATIENT-LVL II: CPT | Mod: PBBFAC,,, | Performed by: ORTHOPAEDIC SURGERY

## 2018-12-11 PROCEDURE — 99213 OFFICE O/P EST LOW 20 MIN: CPT | Mod: S$PBB,25,, | Performed by: ORTHOPAEDIC SURGERY

## 2018-12-11 PROCEDURE — 20610 DRAIN/INJ JOINT/BURSA W/O US: CPT | Mod: S$PBB,LT,, | Performed by: ORTHOPAEDIC SURGERY

## 2018-12-11 PROCEDURE — 99212 OFFICE O/P EST SF 10 MIN: CPT | Mod: PBBFAC,PN | Performed by: ORTHOPAEDIC SURGERY

## 2018-12-11 RX ORDER — TRIAMCINOLONE ACETONIDE 40 MG/ML
40 INJECTION, SUSPENSION INTRA-ARTICULAR; INTRAMUSCULAR
Status: COMPLETED | OUTPATIENT
Start: 2018-12-11 | End: 2018-12-11

## 2018-12-11 RX ORDER — MUPIROCIN 20 MG/G
OINTMENT TOPICAL
Status: CANCELLED | OUTPATIENT
Start: 2018-12-11

## 2018-12-11 RX ORDER — ACETAMINOPHEN 325 MG/1
1000 TABLET ORAL
Status: CANCELLED | OUTPATIENT
Start: 2018-12-11 | End: 2018-12-11

## 2018-12-11 RX ORDER — OXYCODONE HCL 10 MG/1
10 TABLET, FILM COATED, EXTENDED RELEASE ORAL
Status: CANCELLED | OUTPATIENT
Start: 2018-12-11 | End: 2018-12-11

## 2018-12-11 RX ORDER — NAPROXEN 250 MG/1
500 TABLET ORAL
Status: CANCELLED | OUTPATIENT
Start: 2018-12-11 | End: 2018-12-11

## 2018-12-11 RX ORDER — SODIUM CHLORIDE 0.9 % (FLUSH) 0.9 %
3 SYRINGE (ML) INJECTION
Status: CANCELLED | OUTPATIENT
Start: 2018-12-11

## 2018-12-11 RX ORDER — PREGABALIN 50 MG/1
150 CAPSULE ORAL
Status: CANCELLED | OUTPATIENT
Start: 2018-12-11 | End: 2018-12-11

## 2018-12-11 RX ADMIN — TRIAMCINOLONE ACETONIDE 40 MG: 40 INJECTION, SUSPENSION INTRA-ARTICULAR; INTRAMUSCULAR at 03:12

## 2018-12-11 NOTE — PROGRESS NOTES
Subjective:      Patient ID: Perri Youngblood is a 53 y.o. female.    Chief Complaint: Pain and Follow-up of the Left Knee    HPI patient had acute onset of left knee pain about 3 weeks ago standing up from a chair.  No specific injury.  Pain and swelling or now improved to some degree without specific intervention. Patient had mild intermittent knee pain prior to this, but the severity this was a new symptom    Review of Systems   Constitution: Negative for fever and weight loss.   HENT: Negative for congestion.    Eyes: Negative for visual disturbance.   Cardiovascular: Negative for chest pain.   Respiratory: Negative for shortness of breath.    Hematologic/Lymphatic: Negative for bleeding problem. Does not bruise/bleed easily.   Skin: Negative for poor wound healing.   Musculoskeletal: Positive for joint pain.   Gastrointestinal: Negative for abdominal pain.   Genitourinary: Negative for dysuria.   Neurological: Negative for seizures.   Psychiatric/Behavioral: Negative for altered mental status.   Allergic/Immunologic: Negative for persistent infections.         Objective:            Ortho/SPM Exam    Left knee    The patient is not in acute distress.   Body habitus is normal.   Sclera appear normal  No respiratory distress  The patient walks without a limp.  Resisted SLR negative.   The skin over the knee is intact.  Knee effusion trace  Tendernes is located absent.  Range of motion- Flexion full, Extension full.   Ligament exam:   MCL intact   Lachman intact              Post sag intact    LCL intact  Patellar apprehension negative.  Popliteal cyst positive  Patellar crepitation present  Flexion/pinch negative.  Pulses DP present, PT present.  Motor normal 5/5 strength in all tested muscle groups.   Sensory normal.    I reviewed the relevant radiographic images for the patient's condition:  There is moderate diffuse joint space narrowing of the left knee. Acute fracture      Assessment:       Encounter  Diagnosis   Name Primary?    Primary osteoarthritis of left knee Yes          Plan:       Perri was seen today for pain and follow-up.    Diagnoses and all orders for this visit:    Primary osteoarthritis of left knee      I explained my diagnostic impression and the reasoning behind it in detail, using layman's terms.  Models and/or pictures were used to help in the explanation.    Injection recommended and consent given    The patient is scheduled for hip replacement on Monday.  I explained that if she is having disabling knee symptoms by the end of the week she should call and let me know.

## 2018-12-11 NOTE — H&P
Subjective:      Patient ID: Perri Youngblood is a 53 y.o. female.    Chief Complaint:  Right hip pain  HPI the patient complains of progressive pain in the right groin radiating to the knee. No relief with activity modification and over-the-counter medication.  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      Past Medical History:   Diagnosis Date    Arthritis     May-Thurner syndrome     Osteopenia of lumbar spine     Osteoporosis     Peripheral vascular disease     Tachycardia      Past Surgical History:   Procedure Laterality Date    ARTHROGRAM L hip Corticosteroid injection  Left 1/3/2018    [facility]formed [facility] OR 2ND FLR    ARTHROPLASTY-HIP Left 4/16/2018    [facility]    BLADDER SUSPENSION      CLOSED REDUCTION WRIST FRACTURE      FEMORAL ARTERY STENT      HYSTERECTOMY      INJECTION-STEROID  1/3/2018    Performed by Hal [facility]iedo [facility] at [facility] OR 2ND FLR     Social History     Occupational History    Not on file   Tobacco Use    Smoking status: Never Smoker    Smokeless tobacco: Never Used   Substance and Sexual Activity    Alcohol use: No    Drug use: No    Sexual activity: Not on file      ROS  Current Outpatient Medications on File Prior to Visit   Medication Sig Dispense Refill    alendronate (FOSAMAX) 70 MG tablet Take by mouth every 7 days.       aspirin (ECOTRIN) 81 MG EC tablet Take 81 mg by mouth once daily.      meloxicam (MOBIC) 15 MG tablet Take 1 tablet (15 mg total) by mouth once daily. 90 tablet 0    metoprolol succinate (TOPROL-XL) 25 MG 24 hr tablet Take 25 mg by mouth once daily.      oxybutynin (DITROPAN-XL) 10 MG 24 hr tablet Take 1 tablet (10 mg total) by mouth once daily. 30 tablet 11     Current Facility-Administered Medications on File Prior to Visit   Medication Dose Route Frequency Provider Last Rate Last Dose    sodium chloride 0.9% flush 3 mL  3 mL Intravenous PRN Papa HENSLEY  MD Kranthi        [COMPLETED] triamcinolone acetonide injection 40 mg  40 mg Other 1 time in Clinic/HOD Papa Crisostomo MD   40 mg at 12/11/18 3942     Review of patient's allergies indicates:   Allergen Reactions    Adhesive Rash    Bactrim [sulfamethoxazole-trimethoprim] Rash    Sulfa (sulfonamide antibiotics) Hives         Objective:      There were no vitals filed for this visit.  Ortho Exam     Ortho Exam      The patient is not in acute distress.   Body habitus is:normal.   Sclerae normal  Heart regular rate rhythm  Chest clear  The patient walks with a limp.   none  The skin over the hip is:intact.   There is:no local tenderness.   Range of motion- Flexion 85, External rotation 20, internal rotation 10.  Resisted SLR positive.  Pain with rotation positive  Sciatic tension findings negative.  Shortening/lengthening compared to the contralateral side exam deferred.  Pulses DP present, PT present.  Motor normal 5/5 strength in all tested muscle groups.   Sensory normal.  I reviewed the relevant radiographic images for the patient's condition:  Previous right hip films document joint space narrowing with small osteophytes                Assessment:       1. Primary osteoarthritis of right hip     The patient has uncontrolled symptoms despite nonsurgical care.      Plan:       Right total hip replacement was explained to the patient.  The nature of the procedure was explained using a model.  The expected perioperative clinical course and period of recovery as applicable to the patient's condition was described.  The risks including death, infection, thromboembolic events, instability, leg length discrepancy, persistent pain/stiffness and implant failure due to wear or loosening were all explained.  The possibility and expectations of continued nonsurgical care were reviewed.  The patient understands and wishes to proceed with the recommended operation.

## 2018-12-14 ENCOUNTER — TELEPHONE (OUTPATIENT)
Dept: ORTHOPEDICS | Facility: HOSPITAL | Age: 53
End: 2018-12-14

## 2018-12-17 PROBLEM — M16.11 PRIMARY OSTEOARTHRITIS OF RIGHT HIP: Status: ACTIVE | Noted: 2018-12-17

## 2018-12-20 ENCOUNTER — TELEPHONE (OUTPATIENT)
Dept: ORTHOPEDICS | Facility: CLINIC | Age: 53
End: 2018-12-20

## 2018-12-20 NOTE — TELEPHONE ENCOUNTER
Peer to peer was performed this am. Previous surgical case was denied 2/2 INPATIENT status. Doctor from [facility] [place] recommends re-submitting case request as OUTPATIENT EXTENDED RECOVERY. He was confident case would be approved in the future with these changes.     Lashae Ellis

## 2018-12-20 NOTE — TELEPHONE ENCOUNTER
Left message for patient in regards to calling office back .  ----- Message from Isabela Roblero sent at 12/20/2018  2:29 PM CST -----  Contact: [phone]/ self   Patient requesting call back as she has questions to ask nurse. Please call to further discuss.

## 2019-01-09 ENCOUNTER — TELEPHONE (OUTPATIENT)
Dept: ORTHOPEDICS | Facility: CLINIC | Age: 54
End: 2019-01-09

## 2019-01-09 NOTE — TELEPHONE ENCOUNTER
----- Message from Nelda Watson sent at 1/9/2019  1:59 PM CST -----  Contact: [phone]  Patient requested to speak with the nurse about the surgery she supposed to have. Please call.

## 2019-03-12 ENCOUNTER — OFFICE VISIT (OUTPATIENT)
Dept: ORTHOPEDICS | Facility: CLINIC | Age: 54
End: 2019-03-12
Payer: MEDICAID

## 2019-03-12 VITALS — WEIGHT: 175 LBS | BODY MASS INDEX: 28.12 KG/M2 | HEIGHT: 66 IN

## 2019-03-12 DIAGNOSIS — M17.11 PRIMARY OSTEOARTHRITIS OF RIGHT KNEE: Primary | ICD-10-CM

## 2019-03-12 PROCEDURE — 20610 DRAIN/INJ JOINT/BURSA W/O US: CPT | Mod: PBBFAC,PN | Performed by: ORTHOPAEDIC SURGERY

## 2019-03-12 PROCEDURE — 99213 PR OFFICE/OUTPT VISIT, EST, LEVL III, 20-29 MIN: ICD-10-PCS | Mod: 25,S$PBB,, | Performed by: ORTHOPAEDIC SURGERY

## 2019-03-12 PROCEDURE — 20610 DRAIN/INJ JOINT/BURSA W/O US: CPT | Mod: S$PBB,RT,, | Performed by: ORTHOPAEDIC SURGERY

## 2019-03-12 PROCEDURE — 99999 PR PBB SHADOW E&M-EST. PATIENT-LVL II: CPT | Mod: PBBFAC,,, | Performed by: ORTHOPAEDIC SURGERY

## 2019-03-12 PROCEDURE — 20610 PR DRAIN/INJECT LARGE JOINT/BURSA: ICD-10-PCS | Mod: S$PBB,RT,, | Performed by: ORTHOPAEDIC SURGERY

## 2019-03-12 PROCEDURE — 99213 OFFICE O/P EST LOW 20 MIN: CPT | Mod: 25,S$PBB,, | Performed by: ORTHOPAEDIC SURGERY

## 2019-03-12 PROCEDURE — 99212 OFFICE O/P EST SF 10 MIN: CPT | Mod: PBBFAC,PN,25 | Performed by: ORTHOPAEDIC SURGERY

## 2019-03-12 PROCEDURE — 99999 PR PBB SHADOW E&M-EST. PATIENT-LVL II: ICD-10-PCS | Mod: PBBFAC,,, | Performed by: ORTHOPAEDIC SURGERY

## 2019-03-12 RX ORDER — TRIAMCINOLONE ACETONIDE 40 MG/ML
40 INJECTION, SUSPENSION INTRA-ARTICULAR; INTRAMUSCULAR
Status: COMPLETED | OUTPATIENT
Start: 2019-03-12 | End: 2019-03-12

## 2019-03-12 RX ADMIN — TRIAMCINOLONE ACETONIDE 40 MG: 40 INJECTION, SUSPENSION INTRA-ARTICULAR; INTRAMUSCULAR at 01:03

## 2019-03-12 NOTE — PROCEDURES
Procedures       After obtaining verbal informed consent the patient's right knee was prepped aseptically and injected through an inferior lateral approach using 40 mg of triamcinolone and 1 cc of 1% plain Xylocaine.  The patient was warned about postinjection flare and how to manage it with ice, rest and over-the-counter analgesics.  They're advised to contact me for any severe, uncontrolled pain.

## 2019-03-12 NOTE — PROGRESS NOTES
Subjective:      Patient ID: Perri Youngblood is a 54 y.o. female.    Chief Complaint: Pain and Injections of the Right Knee    HPI follow-up for osteoarthritis.  Patient complains of recent increased pain and clicking in the right knee. She also has longstanding right hip pain hopes to have hip replacement sometime later this year.  Review of Systems   Constitution: Negative for fever and weight loss.   HENT: Negative for congestion.    Eyes: Negative for visual disturbance.   Cardiovascular: Negative for chest pain.   Respiratory: Negative for shortness of breath.    Hematologic/Lymphatic: Negative for bleeding problem. Does not bruise/bleed easily.   Skin: Negative for poor wound healing.   Musculoskeletal: Positive for joint pain.   Gastrointestinal: Negative for abdominal pain.   Genitourinary: Negative for dysuria.   Neurological: Negative for seizures.   Psychiatric/Behavioral: Negative for altered mental status.   Allergic/Immunologic: Negative for persistent infections.         Objective:            Ortho/SPM Exam  Right knee    [unfilled]    The patient is not in acute distress.   Sclerae normal  Body habitus is normal.  Respiratory distress:  none   The patient walks with a limp.  Hip irritability  positive.   The skin over the knee is intact.  Knee effusion 0  Tendernes is located none  Range of motion- Flexion 130 deg, Extension 0 deg,   Ligament laxity exam:   MCL trace   Lachman 0   Post sag  0    LCL 0  Patellar apprehension negative.  Popliteal cyst negative  Patellar crepitation present.  Flexion/pinch negative  Pulses DP present, PT present.  Motor normal 5/5 strength in all tested muscle groups.   Sensory normal.        Assessment:       Encounter Diagnosis   Name Primary?    Primary osteoarthritis of right knee Yes          The right hip degeneration is contributing significantly to right knee symptoms.  Plan:       Perri was seen today for pain and injections.    Diagnoses and all orders  for this visit:    Primary osteoarthritis of right knee        I explained my diagnostic impression and the reasoning behind it in detail, using layman's terms.  I explained that until the right hip is addressed, definitive control knee symptoms is almost not possible    Injection of right knee requested and consent given

## 2019-03-14 ENCOUNTER — TELEPHONE (OUTPATIENT)
Dept: PODIATRY | Facility: CLINIC | Age: 54
End: 2019-03-14

## 2019-03-14 ENCOUNTER — OFFICE VISIT (OUTPATIENT)
Dept: PODIATRY | Facility: CLINIC | Age: 54
End: 2019-03-14
Payer: MEDICAID

## 2019-03-14 VITALS
HEIGHT: 66 IN | BODY MASS INDEX: 28.77 KG/M2 | SYSTOLIC BLOOD PRESSURE: 143 MMHG | RESPIRATION RATE: 18 BRPM | DIASTOLIC BLOOD PRESSURE: 80 MMHG | WEIGHT: 179 LBS | HEART RATE: 60 BPM

## 2019-03-14 DIAGNOSIS — M85.80 OSTEOPENIA, UNSPECIFIED LOCATION: Primary | ICD-10-CM

## 2019-03-14 DIAGNOSIS — M20.21 HALLUX RIGIDUS OF RIGHT FOOT: ICD-10-CM

## 2019-03-14 PROCEDURE — 99214 OFFICE O/P EST MOD 30 MIN: CPT | Mod: S$PBB,,, | Performed by: PODIATRIST

## 2019-03-14 PROCEDURE — 99214 PR OFFICE/OUTPT VISIT, EST, LEVL IV, 30-39 MIN: ICD-10-PCS | Mod: S$PBB,,, | Performed by: PODIATRIST

## 2019-03-14 PROCEDURE — 99214 OFFICE O/P EST MOD 30 MIN: CPT | Mod: PBBFAC,PN | Performed by: PODIATRIST

## 2019-03-14 PROCEDURE — 99999 PR PBB SHADOW E&M-EST. PATIENT-LVL IV: ICD-10-PCS | Mod: PBBFAC,,, | Performed by: PODIATRIST

## 2019-03-14 PROCEDURE — 99999 PR PBB SHADOW E&M-EST. PATIENT-LVL IV: CPT | Mod: PBBFAC,,, | Performed by: PODIATRIST

## 2019-03-14 RX ORDER — VIT C/E/ZN/COPPR/LUTEIN/ZEAXAN 250MG-90MG
2000 CAPSULE ORAL DAILY
Refills: 0 | COMMUNITY
Start: 2019-03-14 | End: 2019-06-12

## 2019-03-14 RX ORDER — LIDOCAINE HYDROCHLORIDE 10 MG/ML
1 INJECTION, SOLUTION EPIDURAL; INFILTRATION; INTRACAUDAL; PERINEURAL ONCE
Status: CANCELLED | OUTPATIENT
Start: 2019-03-14 | End: 2019-03-14

## 2019-03-14 RX ORDER — SODIUM CHLORIDE 0.9 % (FLUSH) 0.9 %
5 SYRINGE (ML) INJECTION
Status: CANCELLED | OUTPATIENT
Start: 2019-03-14

## 2019-03-14 NOTE — TELEPHONE ENCOUNTER
Per Dr Kelly--can you let mrs. nash know that her vitamin came back as little low so I placed an order for vitamin D, informed patient of lab and  that Rx Vitamin D sent to  [facility] pharmacy in [place] .

## 2019-03-14 NOTE — PROGRESS NOTES
"Subjective:      Patient ID: Perri Youngblood is a 54 y.o. female.    Chief Complaint: Foot Pain (bilateral, right foot hurts worse )- we focused on the right foot because it hurts a lot more than left foot. We will address left foot after addressing right foot.     Presenting with bilateral 1st MPJ pain. Right foot is a lot worse than left foot. She also having pain at the 2nd MPJ. It is associated with swelling after prolong walking and ambulation. She also had left hip replacement in April which she is doing really well. She is seeing Dr. Becker for right hip. She tell me she has OA of the right knee as well. She will get xrays for hips soon.  She has been having pain at the 1st MPJ for "a long time" it recently started bothering her. Pain starts after ambulation and gets better with resting and elevation. Compression socks helps with swelling. Describes the pain 8/10 throbbing. Denies other pedal issues.     3/14/19: she is presenting for surgical consultation of the right foot pain. She has R 1st MPJ rigidus. She would like to proceed with surgical intervention since she has exhausted from conservative treatment. She recently had injection of the right knee. She has PMH of osteoporosis and h/o stents of LLE. She states 2nd toe does not bother her anymore. She only has symptoms on the 1st MPJ.     Review of Systems   Constitution: Negative for decreased appetite, fever, weakness and malaise/fatigue.   HENT: Negative for congestion.    Cardiovascular: Negative for chest pain and leg swelling.   Respiratory: Negative for cough and shortness of breath.    Skin: Negative for color change, nail changes and rash.   Musculoskeletal: Positive for arthritis, joint pain, joint swelling and stiffness. Negative for muscle weakness.   Gastrointestinal: Negative for bloating, abdominal pain, nausea and vomiting.   Neurological: Negative for headaches and numbness.   Psychiatric/Behavioral: Negative for altered mental " "status.     Vitals:    03/14/19 1010   BP: (!) 143/80   Pulse: 60   Resp: 18   Weight: 81.2 kg (179 lb)   Height: 5' 6" (1.676 m)   PainSc:   8   PainLoc: Foot           Objective:      Physical Exam   Constitutional: She is oriented to person, place, and time. She appears well-developed and well-nourished. No distress.   Musculoskeletal: She exhibits edema, tenderness and deformity.   Neurological: She is alert and oriented to person, place, and time.   Skin: Skin is warm and dry. Capillary refill takes less than 2 seconds.   Psychiatric: She has a normal mood and affect.     Vascular: Distal DP/PT pulses palpable 2/4 b/l. CRT < 3 sec to tips of toes. No vericosities noted to b/l LEs. Hair growth present b/l LE, warm to touch b/l LE  R foot: mild edema over right 1st MPJ  L foot: mild edema over right 1st MPJ    Dermatologic: No open lesions, lacerations or wounds. +spider veins b/l. Interdigital spaces clean, dry and intact b/l. No erythema, rubor, calor noted to b/l LE  R foot: pre ulcerative lesion at 2nd MPJ    Musculoskeletal: Equinus noted b/l ankles with < 10 deg DF noted b/l. MMT 5/5 in DF/PF/Inv/Ev resistance with no reproduction of pain in any direction. Passive range of motion of ankle and pedal joints is painless b/l. No calf tenderness b/l LE, Compartments soft/compressible b/l.   R foot: pain over 1st MPJ. Limited ROM at 1st MPJ. +crepitus, 2nd toe overlapping hallux    Neurological: Light touch, proprioception, and sharp/dull sensation are all intact b/l. Protective threshold with the [place]-Wienstein monofilament is intact b/l. Vibratory sensation intact b/l.           Assessment:       Encounter Diagnoses   Name Primary?    Osteopenia, unspecified location Yes    Hallux rigidus of right foot          Plan:       Perri was seen today for foot pain.    Diagnoses and all orders for this visit:    Osteopenia, unspecified location  -     VITAMIN D; Future    Hallux rigidus of right foot  -     Case " Request Operating Room: FUSION, MTP JOINT      I counseled the patient on her conditions, their implications and medical management.     -54 y/o female with 1. R hallux rigidus 2. R 2nd MPJ plantar plate attenuation 3. R 2nd crossover toe 4. L hallux limitus     -x-rays were once again reviewed with the patient. Consistent with decreased joint space at 1st metatarsophalangeal joint with sclerosis, arthritis.  Different surgical options were discussed with the patient but based on clinical and radiographic findings I recommended joint destructive procedures 1st MPJ implant versus 1st MPJ arthrodesis.  Patient would like to proceed with the 1st MPJ arthrodesis. We will not address 2nd toe since it is asymptomatic.   -surgery will be on March 26th at [facility].  -surgical clearance pending  -Rx. Vitamin D- came back as 29. I have placed an order for vitaminD3 2000 units once a day for 3 months.   -Long discussion with patient regarding the procedure in detail.  Informed consent discussed in detail  including all alternatives, risks and complications not limited to consent form. These included but is not limited to bleeding, pain, infection, swelling, scarring, nerve entrapment, RSD, paralysis, loss of function of part or all of foot, brain damage and death.  Patient understands all risks, potential complications, and alternatives, including, but not limited to those listed on the consent form. All questions were answered. No guarantees given or implied as to outcome. Informed verbal and written consent was obtained. Consent forms read, signed, witnessed.   -During today's patient's visit, I spent 40 minutes with the patient. Greater than 50% of the time was spent discussing the items listed including chart review. The patient was evaluated and treated. I discussed diagnoses, prognosis and treatment with patient and family and reviewed diagnostic images. I have recommended surgery for patient. The risks vs. benefits of a  surgical procedure to address the patient's concerns were discussed as well as alternative management options. Patient desires surgery and arrangements will be made accordingly. The alternatives, risks and complications of surgery were discussed including but not limited to  infection, swelling, numbness, post-operative pain, along with the fact that no guarantees can be given. All the patients' questions were answered and the patient seemed comfortable with my explanation. The patient was also instructed that prior to surgery if at any time more questions were to come up patient should contact the office and we'll be happy to answer them. The types of surgical approaches available were reviewed as well as alternatives to a surgical approach. The patient will be scheduled for surgery.The informed surgical consent was reviewed and read aloud to the patient. I have reviewed the films and I have discussed my findings with the patient in great detail. I have discussed all risks including but not limited to infection, stiffness, scarring, limp, disability, deformity, damage to blood vessels or nerves, numbness, poor healing, need for braces, arthritis, chronic pain, amputation, and death. All benefits and realistic expectations discussed in great detail. I have made no promises as to the outcome. I have provided realistic expectations. I have offered the patient a second opinion which they have declined and have assured me they prefer to proceed despite the risks.  -I stressed the importance of usage of narcotics. I told patient that I am not a long term pain management physician. I told patient I will be able to dispense narcotics to control the acute phase of pain but I WILL NOT be able to prescribe long term pain medications. patient understood.   -The nature of the condition, options for management, as well as potential risks and complications were discussed in detail with patient. Patient was amenable to my  recommendations and left my office fully informed and will follow up as instructed or sooner if necessary.    -f/u prn

## 2019-03-21 ENCOUNTER — TELEPHONE (OUTPATIENT)
Dept: ORTHOPEDICS | Facility: CLINIC | Age: 54
End: 2019-03-21

## 2019-03-21 ENCOUNTER — TELEPHONE (OUTPATIENT)
Dept: PODIATRY | Facility: CLINIC | Age: 54
End: 2019-03-21

## 2019-03-21 NOTE — TELEPHONE ENCOUNTER
----- Message from Wily Roblero sent at 3/21/2019  8:46 AM CDT -----  Contact: self/[phone]  Patient called to speak with your office about her upcoming surgery.    Please call and advise.

## 2019-03-21 NOTE — TELEPHONE ENCOUNTER
Spoke with patient in regards wanting hip injection . Verbalized undeerstanding  ----- Message from Wily Roblero sent at 3/21/2019  8:47 AM CDT -----  Contact: self/[phone]  Patient called to speak with Carla about her [employer] information.    Please call and advise.

## 2019-03-22 ENCOUNTER — TELEPHONE (OUTPATIENT)
Dept: PODIATRY | Facility: CLINIC | Age: 54
End: 2019-03-22

## 2019-03-25 ENCOUNTER — TELEPHONE (OUTPATIENT)
Dept: PODIATRY | Facility: CLINIC | Age: 54
End: 2019-03-25

## 2019-03-25 NOTE — TELEPHONE ENCOUNTER
Received H&P from Perri Youngblood that patient is cleared for surgery. Its scanned into the media.

## 2019-03-26 PROBLEM — M85.80 OSTEOPENIA: Status: ACTIVE | Noted: 2019-03-26

## 2019-04-01 ENCOUNTER — OFFICE VISIT (OUTPATIENT)
Dept: PODIATRY | Facility: CLINIC | Age: 54
End: 2019-04-01

## 2019-04-01 VITALS
BODY MASS INDEX: 28.61 KG/M2 | WEIGHT: 178 LBS | HEART RATE: 70 BPM | HEIGHT: 66 IN | RESPIRATION RATE: 16 BRPM | DIASTOLIC BLOOD PRESSURE: 76 MMHG | SYSTOLIC BLOOD PRESSURE: 115 MMHG

## 2019-04-01 DIAGNOSIS — Z09 FOLLOW-UP EXAMINATION FOLLOWING SURGERY: Primary | ICD-10-CM

## 2019-04-01 PROCEDURE — 99024 PR POST-OP FOLLOW-UP VISIT: ICD-10-PCS | Mod: ,,, | Performed by: PODIATRIST

## 2019-04-01 PROCEDURE — 99999 PR PBB SHADOW E&M-EST. PATIENT-LVL III: CPT | Mod: PBBFAC,,, | Performed by: PODIATRIST

## 2019-04-01 PROCEDURE — 99999 PR PBB SHADOW E&M-EST. PATIENT-LVL III: ICD-10-PCS | Mod: PBBFAC,,, | Performed by: PODIATRIST

## 2019-04-01 PROCEDURE — 99213 OFFICE O/P EST LOW 20 MIN: CPT | Mod: PBBFAC,PN | Performed by: PODIATRIST

## 2019-04-01 PROCEDURE — 99024 POSTOP FOLLOW-UP VISIT: CPT | Mod: ,,, | Performed by: PODIATRIST

## 2019-04-01 NOTE — PROGRESS NOTES
"Subjective:      Patient ID: Perri Youngblood is a 54 y.o. female.    Chief Complaint: Foot Pain (bilateral, right foot hurts worse )- we focused on the right foot because it hurts a lot more than left foot. We will address left foot after addressing right foot.     Presenting with bilateral 1st MPJ pain. Right foot is a lot worse than left foot. She also having pain at the 2nd MPJ. It is associated with swelling after prolong walking and ambulation. She also had left hip replacement in April which she is doing really well. She is seeing Dr. Becker for right hip. She tell me she has OA of the right knee as well. She will get xrays for hips soon.  She has been having pain at the 1st MPJ for "a long time" it recently started bothering her. Pain starts after ambulation and gets better with resting and elevation. Compression socks helps with swelling. Describes the pain 8/10 throbbing. Denies other pedal issues.     3/14/19: she is presenting for surgical consultation of the right foot pain. She has R 1st MPJ rigidus. She would like to proceed with surgical intervention since she has exhausted from conservative treatment. She recently had injection of the right knee. She has PMH of osteoporosis and h/o stents of LLE. She states 2nd toe does not bother her anymore. She only has symptoms on the 1st MPJ.     4/1/19: s/p R 1st MPJ fusion (DOS: 3/26/19 GP: 6/26/19). Pain is controlled.  She tells me she was having some pressure pain on top of the foot so she has loosened Ace bandages and to posterior splint off.  She has been partially weight-bearing on the heel and also side of the foot.     Review of Systems   Constitution: Negative for decreased appetite, fever and malaise/fatigue.   HENT: Negative for congestion.    Cardiovascular: Negative for chest pain and leg swelling.   Respiratory: Negative for cough and shortness of breath.    Skin: Negative for color change, nail changes and rash.   Musculoskeletal: Positive " "for arthritis, joint swelling and stiffness. Negative for joint pain and muscle weakness.   Gastrointestinal: Negative for bloating, abdominal pain, nausea and vomiting.   Neurological: Negative for headaches, numbness and weakness.   Psychiatric/Behavioral: Negative for altered mental status.     Vitals:    04/01/19 1315   BP: 115/76   Pulse: 70   Resp: 16   Weight: 80.7 kg (178 lb)   Height: 5' 6" (1.676 m)   PainSc:   5   PainLoc: Foot           Objective:      Physical Exam   Constitutional: She is oriented to person, place, and time. She appears well-developed and well-nourished. No distress.   Musculoskeletal: She exhibits edema, tenderness and deformity.   Neurological: She is alert and oriented to person, place, and time.   Skin: Skin is warm and dry. Capillary refill takes less than 2 seconds.   Psychiatric: She has a normal mood and affect.     Vascular: Distal DP/PT pulses palpable 2/4 b/l. CRT < 3 sec to tips of toes. No vericosities noted to b/l LEs. Hair growth present b/l LE, warm to touch b/l LE  R foot: mild edema over right 1st MPJ  L foot: mild edema over right 1st MPJ    Dermatologic: No open lesions, lacerations or wounds. +spider veins b/l. Interdigital spaces clean, dry and intact b/l. No erythema, rubor, calor noted to b/l LE  R foot:  Sutures clean, dry, intact. + mild rubor.  No drainage, no crepitus, no signs of infection, + ecchymosis around the toes    Musculoskeletal: Equinus noted b/l ankles with < 10 deg DF noted b/l. MMT 5/5 in DF/PF/Inv/Ev resistance with no reproduction of pain in any direction. Passive range of motion of ankle and pedal joints is painless b/l. No calf tenderness b/l LE, Compartments soft/compressible b/l.   R foot:  Status post 1st MPJ fusion    Neurological: Light touch, proprioception, and sharp/dull sensation are all intact b/l. Protective threshold with the [place]-Wienstein monofilament is intact b/l. Vibratory sensation intact b/l.     4/1/19          "   Assessment:       Encounter Diagnosis   Name Primary?    Follow-up examination following surgery Yes         Plan:       Perri was seen today for post-op evaluation.    Diagnoses and all orders for this visit:    Follow-up examination following surgery      I counseled the patient on her conditions, their implications and medical management.     -54 y/o female with status post right 1st MPJ fusion    -postop x-rays an intraop picture of 1st MPJ were reviewed with the patient.   -I recommend nonweightbearing of the right foot. I gave her surgical shoes.  Nonweightbearing for now, minimal weight-bearing to heel for transfer only.  -c/w Ice and elevation   -follow up 1 week for suture removal  -continue with vitamin D 2000unit  -The nature of the condition, options for management, as well as potential risks and complications were discussed in detail with patient. Patient was amenable to my recommendations and left my office fully informed and will follow up as instructed or sooner if necessary.    -Patient was advised of signs and symptoms of infection including redness, drainage, purulence, odor, streaking, fever, chills and I advised patient to seek medical attention (ER or urgent care) if these symptoms arise.

## 2019-04-08 ENCOUNTER — OFFICE VISIT (OUTPATIENT)
Dept: PODIATRY | Facility: CLINIC | Age: 54
End: 2019-04-08

## 2019-04-08 VITALS
BODY MASS INDEX: 28.61 KG/M2 | HEIGHT: 66 IN | DIASTOLIC BLOOD PRESSURE: 74 MMHG | HEART RATE: 72 BPM | WEIGHT: 178 LBS | RESPIRATION RATE: 16 BRPM | SYSTOLIC BLOOD PRESSURE: 115 MMHG

## 2019-04-08 DIAGNOSIS — Z09 FOLLOW-UP EXAMINATION FOLLOWING SURGERY: Primary | ICD-10-CM

## 2019-04-08 PROCEDURE — 99213 OFFICE O/P EST LOW 20 MIN: CPT | Mod: PBBFAC,PN | Performed by: PODIATRIST

## 2019-04-08 PROCEDURE — 99024 POSTOP FOLLOW-UP VISIT: CPT | Mod: ,,, | Performed by: PODIATRIST

## 2019-04-08 PROCEDURE — 99999 PR PBB SHADOW E&M-EST. PATIENT-LVL III: ICD-10-PCS | Mod: PBBFAC,,, | Performed by: PODIATRIST

## 2019-04-08 PROCEDURE — 99999 PR PBB SHADOW E&M-EST. PATIENT-LVL III: CPT | Mod: PBBFAC,,, | Performed by: PODIATRIST

## 2019-04-08 PROCEDURE — 99024 PR POST-OP FOLLOW-UP VISIT: ICD-10-PCS | Mod: ,,, | Performed by: PODIATRIST

## 2019-04-08 NOTE — PROGRESS NOTES
"Subjective:      Patient ID: Perri Youngblood is a 54 y.o. female.    Chief Complaint: Foot Pain (bilateral, right foot hurts worse )- we focused on the right foot because it hurts a lot more than left foot. We will address left foot after addressing right foot.     Presenting with bilateral 1st MPJ pain. Right foot is a lot worse than left foot. She also having pain at the 2nd MPJ. It is associated with swelling after prolong walking and ambulation. She also had left hip replacement in April which she is doing really well. She is seeing Dr. Becker for right hip. She tell me she has OA of the right knee as well. She will get xrays for hips soon.  She has been having pain at the 1st MPJ for "a long time" it recently started bothering her. Pain starts after ambulation and gets better with resting and elevation. Compression socks helps with swelling. Describes the pain 8/10 throbbing. Denies other pedal issues.     3/14/19: she is presenting for surgical consultation of the right foot pain. She has R 1st MPJ rigidus. She would like to proceed with surgical intervention since she has exhausted from conservative treatment. She recently had injection of the right knee. She has PMH of osteoporosis and h/o stents of LLE. She states 2nd toe does not bother her anymore. She only has symptoms on the 1st MPJ.     4/1/19: s/p R 1st MPJ fusion (DOS: 3/26/19 GP: 6/26/19). Pain is controlled.  She tells me she was having some pressure pain on top of the foot so she has loosened Ace bandages and to posterior splint off.  She has been partially weight-bearing on the heel and also side of the foot.     4/8/19: 2nd f/u status post right 1st MPJ fusion.  Pain controlled.  Has been putting minimal pressure on the right heel in surgical shoe.     Review of Systems   Constitution: Negative for decreased appetite, fever and malaise/fatigue.   HENT: Negative for congestion.    Cardiovascular: Negative for chest pain and leg swelling. " "  Respiratory: Negative for cough and shortness of breath.    Skin: Negative for color change, nail changes and rash.   Musculoskeletal: Positive for arthritis, joint swelling and stiffness. Negative for joint pain and muscle weakness.   Gastrointestinal: Negative for bloating, abdominal pain, nausea and vomiting.   Neurological: Negative for headaches, numbness and weakness.   Psychiatric/Behavioral: Negative for altered mental status.     Vitals:    04/08/19 1424   BP: 115/74   Pulse: 72   Resp: 16   Weight: 80.7 kg (178 lb)   Height: 5' 6" (1.676 m)   PainSc: 0-No pain           Objective:      Physical Exam   Constitutional: She is oriented to person, place, and time. She appears well-developed and well-nourished. No distress.   Musculoskeletal: She exhibits edema and tenderness. She exhibits no deformity.   Neurological: She is alert and oriented to person, place, and time.   Skin: Skin is warm and dry. Capillary refill takes less than 2 seconds.   Psychiatric: She has a normal mood and affect.     Vascular: Distal DP/PT pulses palpable 2/4 b/l. CRT < 3 sec to tips of toes. No vericosities noted to b/l LEs. Hair growth present b/l LE, warm to touch b/l LE  R foot: mild edema over right 1st MPJ    Dermatologic: No open lesions, lacerations or wounds. +spider veins b/l. Interdigital spaces clean, dry and intact b/l. No erythema, rubor, calor noted to b/l LE  R foot:  Sutures clean, dry, intact. + mild rubor.  No drainage, no crepitus, no signs of infection, + ecchymosis around the toes    Musculoskeletal: Equinus noted b/l ankles with < 10 deg DF noted b/l. MMT 5/5 in DF/PF/Inv/Ev resistance with no reproduction of pain in any direction. Passive range of motion of ankle and pedal joints is painless b/l. No calf tenderness b/l LE, Compartments soft/compressible b/l.   R foot:  Status post 1st MPJ fusion    Neurological: Light touch, proprioception, and sharp/dull sensation are all intact b/l. Protective threshold " with the [place]-Wienstein monofilament is intact b/l. Vibratory sensation intact b/l.     4/1/19 4/9/19          Assessment:       Encounter Diagnosis   Name Primary?    Follow-up examination following surgery - Right Foot Yes         Plan:       Perri was seen today for follow-up.    Diagnoses and all orders for this visit:    Follow-up examination following surgery - Right Foot      I counseled the patient on her conditions, their implications and medical management.     -52 y/o female with status post right 1st MPJ fusion    -sutures removed.  Patient tolerated well. No postop wound dehiscence.   -I recommend nonweightbearing of the right foot. I gave her surgical shoes.  Nonweightbearing for now, minimal weight-bearing to heel for transfer only.  -c/w Ice and elevation   -follow up 2 week. Will transition to CAM WBAT with PT.   -continue with vitamin D 2000unit  -The nature of the condition, options for management, as well as potential risks and complications were discussed in detail with patient. Patient was amenable to my recommendations and left my office fully informed and will follow up as instructed or sooner if necessary.    -Patient was advised of signs and symptoms of infection including redness, drainage, purulence, odor, streaking, fever, chills and I advised patient to seek medical attention (ER or urgent care) if these symptoms arise.

## 2019-04-22 ENCOUNTER — OFFICE VISIT (OUTPATIENT)
Dept: PODIATRY | Facility: CLINIC | Age: 54
End: 2019-04-22

## 2019-04-22 VITALS
HEIGHT: 66 IN | HEART RATE: 66 BPM | DIASTOLIC BLOOD PRESSURE: 71 MMHG | SYSTOLIC BLOOD PRESSURE: 104 MMHG | BODY MASS INDEX: 28.61 KG/M2 | RESPIRATION RATE: 16 BRPM | WEIGHT: 178 LBS

## 2019-04-22 DIAGNOSIS — Z09 FOLLOW-UP EXAMINATION FOLLOWING SURGERY: ICD-10-CM

## 2019-04-22 DIAGNOSIS — M20.21 HALLUX RIGIDUS OF RIGHT FOOT: Primary | ICD-10-CM

## 2019-04-22 PROCEDURE — 99999 PR PBB SHADOW E&M-EST. PATIENT-LVL IV: CPT | Mod: PBBFAC,,, | Performed by: PODIATRIST

## 2019-04-22 PROCEDURE — 99024 POSTOP FOLLOW-UP VISIT: CPT | Mod: ,,, | Performed by: PODIATRIST

## 2019-04-22 PROCEDURE — 99214 OFFICE O/P EST MOD 30 MIN: CPT | Mod: PBBFAC,PN | Performed by: PODIATRIST

## 2019-04-22 PROCEDURE — 99024 PR POST-OP FOLLOW-UP VISIT: ICD-10-PCS | Mod: ,,, | Performed by: PODIATRIST

## 2019-04-22 PROCEDURE — 99999 PR PBB SHADOW E&M-EST. PATIENT-LVL IV: ICD-10-PCS | Mod: PBBFAC,,, | Performed by: PODIATRIST

## 2019-04-22 NOTE — PROGRESS NOTES
"Subjective:      Patient ID: Perri Youngblood is a 54 y.o. female.    Chief Complaint: Foot Pain (bilateral, right foot hurts worse )- we focused on the right foot because it hurts a lot more than left foot. We will address left foot after addressing right foot.     Presenting with bilateral 1st MPJ pain. Right foot is a lot worse than left foot. She also having pain at the 2nd MPJ. It is associated with swelling after prolong walking and ambulation. She also had left hip replacement in April which she is doing really well. She is seeing Dr. Becker for right hip. She tell me she has OA of the right knee as well. She will get xrays for hips soon.  She has been having pain at the 1st MPJ for "a long time" it recently started bothering her. Pain starts after ambulation and gets better with resting and elevation. Compression socks helps with swelling. Describes the pain 8/10 throbbing. Denies other pedal issues.     3/14/19: she is presenting for surgical consultation of the right foot pain. She has R 1st MPJ rigidus. She would like to proceed with surgical intervention since she has exhausted from conservative treatment. She recently had injection of the right knee. She has PMH of osteoporosis and h/o stents of LLE. She states 2nd toe does not bother her anymore. She only has symptoms on the 1st MPJ.     4/1/19: s/p R 1st MPJ fusion (DOS: 3/26/19 GP: 6/26/19). Pain is controlled.  She tells me she was having some pressure pain on top of the foot so she has loosened Ace bandages and to posterior splint off.  She has been partially weight-bearing on the heel and also side of the foot.     4/8/19: 2nd f/u status post right 1st MPJ fusion.  Pain controlled.  Has been putting minimal pressure on the right heel in surgical shoe.     4/22/19: 3rd f/u s/p R 1st MPJ fusion. Ambulating in flip flops today. She tells me she has been putting her weight on lateral foot when walking. No pain today.       Review of Systems " "  Constitution: Negative for decreased appetite, fever and malaise/fatigue.   HENT: Negative for congestion.    Cardiovascular: Negative for chest pain and leg swelling.   Respiratory: Negative for cough and shortness of breath.    Skin: Negative for color change, nail changes and rash.   Musculoskeletal: Positive for arthritis, joint swelling and stiffness. Negative for joint pain and muscle weakness.   Gastrointestinal: Negative for bloating, abdominal pain, nausea and vomiting.   Neurological: Negative for headaches, numbness and weakness.   Psychiatric/Behavioral: Negative for altered mental status.     Vitals:    04/22/19 1145   BP: 104/71   Pulse: 66   Resp: 16   Weight: 80.7 kg (178 lb)   Height: 5' 6" (1.676 m)   PainSc: 0-No pain           Objective:      Physical Exam   Constitutional: She is oriented to person, place, and time. She appears well-developed and well-nourished. No distress.   Musculoskeletal: She exhibits edema. She exhibits no tenderness or deformity.   Neurological: She is alert and oriented to person, place, and time.   Skin: Skin is warm and dry. Capillary refill takes less than 2 seconds.   Psychiatric: She has a normal mood and affect.     Vascular: Distal DP/PT pulses palpable 2/4 b/l. CRT < 3 sec to tips of toes. No vericosities noted to b/l LEs. Hair growth present b/l LE, warm to touch b/l LE  R foot: mild edema over right 1st MPJ    Dermatologic: No open lesions, lacerations or wounds. +spider veins b/l. Interdigital spaces clean, dry and intact b/l. No erythema, rubor, calor noted to b/l LE  R foot:  Mild wound dehiscence at central incision. Partial thickness. No drainage, no erythema, no signs of infection     Musculoskeletal: Equinus noted b/l ankles with < 10 deg DF noted b/l. MMT 5/5 in DF/PF/Inv/Ev resistance with no reproduction of pain in any direction. Passive range of motion of ankle and pedal joints is painless b/l. No calf tenderness b/l LE, Compartments " soft/compressible b/l.   R foot:  Status post 1st MPJ fusion    Neurological: Light touch, proprioception, and sharp/dull sensation are all intact b/l. Protective threshold with the [place]-Wienstein monofilament is intact b/l. Vibratory sensation intact b/l.     4/1/19 4/9/194/22/19           Assessment:       Encounter Diagnoses   Name Primary?    Hallux rigidus of right foot - Right Foot Yes    Follow-up examination following surgery          Plan:       Perri was seen today for post-op evaluation.    Diagnoses and all orders for this visit:    Hallux rigidus of right foot - Right Foot  -     Ambulatory Referral to Physical/Occupational Therapy    Follow-up examination following surgery      I counseled the patient on her conditions, their implications and medical management.     -54 y/o female with status post right 1st MPJ fusion    -xrays were taken and reviewed w/ pt: hardware intact.   -small wound dehiscence. LWC with triple abx  -CAM given to pt: transition to CAM WBAT for 4 weeks.  -c/w Ice and elevation   -rx. PT  -continue with vitamin D 2000unit  -f/u 4 weeks  -The nature of the condition, options for management, as well as potential risks and complications were discussed in detail with patient. Patient was amenable to my recommendations and left my office fully informed and will follow up as instructed or sooner if necessary.    -Patient was advised of signs and symptoms of infection including redness, drainage, purulence, odor, streaking, fever, chills and I advised patient to seek medical attention (ER or urgent care) if these symptoms arise.

## 2019-05-01 PROBLEM — M79.671 PAIN IN RIGHT FOOT: Status: ACTIVE | Noted: 2019-05-01

## 2019-05-14 RX ORDER — MELOXICAM 15 MG/1
TABLET ORAL
Qty: 90 TABLET | Refills: 0 | OUTPATIENT
Start: 2019-05-14

## 2019-05-23 ENCOUNTER — OFFICE VISIT (OUTPATIENT)
Dept: PODIATRY | Facility: CLINIC | Age: 54
End: 2019-05-23

## 2019-05-23 VITALS
HEART RATE: 67 BPM | HEIGHT: 66 IN | BODY MASS INDEX: 28.61 KG/M2 | WEIGHT: 178 LBS | RESPIRATION RATE: 16 BRPM | DIASTOLIC BLOOD PRESSURE: 73 MMHG | SYSTOLIC BLOOD PRESSURE: 120 MMHG

## 2019-05-23 DIAGNOSIS — M20.21 HALLUX RIGIDUS OF RIGHT FOOT: Primary | ICD-10-CM

## 2019-05-23 DIAGNOSIS — Z09 FOLLOW-UP EXAMINATION FOLLOWING SURGERY: ICD-10-CM

## 2019-05-23 PROCEDURE — 99213 OFFICE O/P EST LOW 20 MIN: CPT | Mod: PBBFAC,PN | Performed by: PODIATRIST

## 2019-05-23 PROCEDURE — 99999 PR PBB SHADOW E&M-EST. PATIENT-LVL III: ICD-10-PCS | Mod: PBBFAC,,, | Performed by: PODIATRIST

## 2019-05-23 PROCEDURE — 99999 PR PBB SHADOW E&M-EST. PATIENT-LVL III: CPT | Mod: PBBFAC,,, | Performed by: PODIATRIST

## 2019-05-23 PROCEDURE — 99024 POSTOP FOLLOW-UP VISIT: CPT | Mod: ,,, | Performed by: PODIATRIST

## 2019-05-23 PROCEDURE — 99024 PR POST-OP FOLLOW-UP VISIT: ICD-10-PCS | Mod: ,,, | Performed by: PODIATRIST

## 2019-05-23 RX ORDER — OXYBUTYNIN CHLORIDE 10 MG/1
10 TABLET, EXTENDED RELEASE ORAL DAILY
Refills: 3 | COMMUNITY
Start: 2019-05-14 | End: 2019-05-29 | Stop reason: ALTCHOICE

## 2019-05-23 NOTE — PROGRESS NOTES
"Subjective:      Patient ID: Perri Youngblood is a 54 y.o. female.    Chief Complaint: Foot Pain (bilateral, right foot hurts worse )- we focused on the right foot because it hurts a lot more than left foot. We will address left foot after addressing right foot.     Presenting with bilateral 1st MPJ pain. Right foot is a lot worse than left foot. She also having pain at the 2nd MPJ. It is associated with swelling after prolong walking and ambulation. She also had left hip replacement in April which she is doing really well. She is seeing Dr. Becker for right hip. She tell me she has OA of the right knee as well. She will get xrays for hips soon.  She has been having pain at the 1st MPJ for "a long time" it recently started bothering her. Pain starts after ambulation and gets better with resting and elevation. Compression socks helps with swelling. Describes the pain 8/10 throbbing. Denies other pedal issues.     3/14/19: she is presenting for surgical consultation of the right foot pain. She has R 1st MPJ rigidus. She would like to proceed with surgical intervention since she has exhausted from conservative treatment. She recently had injection of the right knee. She has PMH of osteoporosis and h/o stents of LLE. She states 2nd toe does not bother her anymore. She only has symptoms on the 1st MPJ.     4/1/19: s/p R 1st MPJ fusion (DOS: 3/26/19 GP: 6/26/19). Pain is controlled.  She tells me she was having some pressure pain on top of the foot so she has loosened Ace bandages and to posterior splint off.  She has been partially weight-bearing on the heel and also side of the foot.     4/8/19: 2nd f/u status post right 1st MPJ fusion.  Pain controlled.  Has been putting minimal pressure on the right heel in surgical shoe.     4/22/19: 3rd f/u s/p R 1st MPJ fusion. Ambulating in flip flops today. She tells me she has been putting her weight on lateral foot when walking. No pain today.     5/23/19:  Follow-up " "status post right 1st MP fusion.  Still working with physical therapy.  She is ambulating in flip-flops today.  She claims that she was wearing Cam Walker for 2 weeks and she has been wearing flip-flops for last 2 weeks (I told her to be weight-bearing as tolerated in Cam walker for 4 weeks in last visit) because she felt good walking in flip-flops.  Denies pain.      Review of Systems   Constitution: Negative for decreased appetite, fever and malaise/fatigue.   HENT: Negative for congestion.    Cardiovascular: Negative for chest pain and leg swelling.   Respiratory: Negative for cough and shortness of breath.    Skin: Negative for color change, nail changes and rash.   Musculoskeletal: Positive for arthritis, joint swelling and stiffness. Negative for joint pain and muscle weakness.   Gastrointestinal: Negative for bloating, abdominal pain, nausea and vomiting.   Neurological: Negative for headaches, numbness and weakness.   Psychiatric/Behavioral: Negative for altered mental status.     Vitals:    05/23/19 1054   BP: 120/73   Pulse: 67   Resp: 16   Weight: 80.7 kg (178 lb)   Height: 5' 6" (1.676 m)   PainSc: 0-No pain           Objective:      Physical Exam   Constitutional: She is oriented to person, place, and time. She appears well-developed and well-nourished. No distress.   Musculoskeletal: She exhibits edema. She exhibits no tenderness or deformity.   Neurological: She is alert and oriented to person, place, and time.   Skin: Skin is warm and dry. Capillary refill takes less than 2 seconds.   Psychiatric: She has a normal mood and affect.     Vascular: Distal DP/PT pulses palpable 2/4 b/l. CRT < 3 sec to tips of toes. No vericosities noted to b/l LEs. Hair growth present b/l LE, warm to touch b/l LE  R foot: mild edema over right 1st MPJ    Dermatologic: No open lesions, lacerations or wounds. +spider veins b/l. Interdigital spaces clean, dry and intact b/l. No erythema, rubor, calor noted to b/l LE  R foot: "  No wound dehiscence.  Incision healed.     Musculoskeletal: Equinus noted b/l ankles with < 10 deg DF noted b/l. MMT 5/5 in DF/PF/Inv/Ev resistance with no reproduction of pain in any direction. Passive range of motion of ankle and pedal joints is painless b/l. No calf tenderness b/l LE, Compartments soft/compressible b/l.   R foot:  Status post 1st MPJ fusion    Neurological: Light touch, proprioception, and sharp/dull sensation are all intact b/l. Protective threshold with the [place]-Wienstein monofilament is intact b/l. Vibratory sensation intact b/l.     4/1/19 4/9/194/22/19           Assessment:       Encounter Diagnoses   Name Primary?    Hallux rigidus of right foot Yes    Follow-up examination following surgery          Plan:       Perri was seen today for post-op evaluation.    Diagnoses and all orders for this visit:    Hallux rigidus of right foot  -     X-Ray Foot Complete Right; Future    Follow-up examination following surgery      I counseled the patient on her conditions, their implications and medical management.     -54 y/o female with status post right 1st MPJ fusion    -Rx right foot x-ray next visit  -weight-bearing as tolerated in normal sneaker  -I told her to discontinue with physical therapy if she is comfortable ambulating.   -continue with vitamin D 2000unit up to 3 months from today.   -f/u 3 months  -The nature of the condition, options for management, as well as potential risks and complications were discussed in detail with patient. Patient was amenable to my recommendations and left my office fully informed and will follow up as instructed or sooner if necessary.    -Patient was advised of signs and symptoms of infection including redness, drainage, purulence, odor, streaking, fever, chills and I advised patient to seek medical attention (ER or urgent care) if these symptoms arise.

## 2019-06-18 ENCOUNTER — OFFICE VISIT (OUTPATIENT)
Dept: ORTHOPEDICS | Facility: CLINIC | Age: 54
End: 2019-06-18

## 2019-06-18 VITALS — WEIGHT: 185 LBS | HEIGHT: 66 IN | BODY MASS INDEX: 29.73 KG/M2

## 2019-06-18 DIAGNOSIS — M16.11 PRIMARY OSTEOARTHRITIS OF RIGHT HIP: Primary | ICD-10-CM

## 2019-06-18 PROCEDURE — 99999 PR PBB SHADOW E&M-EST. PATIENT-LVL III: CPT | Mod: PBBFAC,,, | Performed by: ORTHOPAEDIC SURGERY

## 2019-06-18 PROCEDURE — 99999 PR PBB SHADOW E&M-EST. PATIENT-LVL III: ICD-10-PCS | Mod: PBBFAC,,, | Performed by: ORTHOPAEDIC SURGERY

## 2019-06-18 PROCEDURE — 99213 PR OFFICE/OUTPT VISIT, EST, LEVL III, 20-29 MIN: ICD-10-PCS | Mod: S$PBB,,, | Performed by: ORTHOPAEDIC SURGERY

## 2019-06-18 PROCEDURE — 99213 OFFICE O/P EST LOW 20 MIN: CPT | Mod: S$PBB,,, | Performed by: ORTHOPAEDIC SURGERY

## 2019-06-18 PROCEDURE — 99213 OFFICE O/P EST LOW 20 MIN: CPT | Mod: PBBFAC,PN | Performed by: ORTHOPAEDIC SURGERY

## 2019-06-18 RX ORDER — MELOXICAM 15 MG/1
15 TABLET ORAL DAILY
Qty: 30 TABLET | Refills: 5 | Status: SHIPPED | OUTPATIENT
Start: 2019-06-18 | End: 2020-01-06 | Stop reason: SDUPTHER

## 2019-06-18 NOTE — PROGRESS NOTES
Subjective:      Patient ID: Perri Youngblood is a 54 y.o. female.    Chief Complaint:  Right hip pain  HPI the patient reports that arthritic pain is persistent but bearable.  Her hip causes symptoms but she is still recovering from ft surgery, so she is not focused on the hip at this time.  Review of Systems   Constitution: Negative for fever and weight loss.   HENT: Negative for congestion.    Eyes: Negative for visual disturbance.   Cardiovascular: Negative for chest pain.   Respiratory: Negative for shortness of breath.    Hematologic/Lymphatic: Negative for bleeding problem. Does not bruise/bleed easily.   Skin: Negative for poor wound healing.   Musculoskeletal: Positive for joint pain.   Gastrointestinal: Negative for abdominal pain.   Genitourinary: Negative for dysuria.   Neurological: Negative for seizures.   Psychiatric/Behavioral: Negative for altered mental status.   Allergic/Immunologic: Negative for persistent infections.         Objective:            Ortho/SPM Exam  Right hip    The patient is not in acute distress.   Body habitus is:normal.   Sclerae normal  The patient walks with a limp.   Respiratory distress:  none  The skin over the hip is:intact.   There is:no local tenderness.   Range of motion- Flexion 85, External rotation 20, internal rotation 15.  Resisted SLR positive.  Pain with rotation positive  Sciatic tension findings negative.  Shortening/lengthening compared to the contralateral side exam deferred.  Pulses DP present, PT present.  Motor normal 5/5 strength in all tested muscle groups.   Sensory normal.        Assessment:       No diagnosis found.       From a structural in symptomatic standpoint the condition is moderate  Plan:       There are no diagnoses linked to this encounter.    I explained my diagnostic impression and the reasoning behind it in detail, using layman's terms.  Models and/or pictures were used to help in the explanation.    The patient indicates that she  was considering right hip arthroplasty later this year but her insurance concerns are such that she wants to put off for now.    Refill meloxicam    I explained to the patient that I am providing a prescription for anti-inflammatory medication.  I warned the patient that it should not be taken with other anti-inflammatory medications including over-the-counter products containing ibuprofen and naproxen.  I advised them to consult their primary physician or pharmacist if there is any question about this in the future.  I discussed the possible side effects including cardiovascular issues, renal issues and gastrointestinal problems.  I advised the patient to discontinue the medication should they develop persistent symptoms of dyspepsia.    I also explained that should they elect to continue this medication long-term, that is beyond the prescription that I provide at this time, they should contact their primary physician for refills and appropriate monitoring.    X-ray at follow-up

## 2019-10-14 ENCOUNTER — TELEPHONE (OUTPATIENT)
Dept: ORTHOPEDICS | Facility: CLINIC | Age: 54
End: 2019-10-14

## 2019-10-14 NOTE — TELEPHONE ENCOUNTER
Pt called to make sure we are going to take her new health ins [facility] health network. I told her we do so she was very happy.

## 2019-10-14 NOTE — TELEPHONE ENCOUNTER
----- Message from Niyah Davison sent at 10/14/2019 10:20 AM CDT -----  No. [phone]    Patient has medical questions.  Patient did not go into detail.  Please call.

## 2020-01-06 RX ORDER — MELOXICAM 15 MG/1
15 TABLET ORAL DAILY
Qty: 30 TABLET | Refills: 5 | Status: SHIPPED | OUTPATIENT
Start: 2020-01-06 | End: 2020-07-06

## 2020-01-10 ENCOUNTER — HOSPITAL ENCOUNTER (OUTPATIENT)
Dept: RADIOLOGY | Facility: HOSPITAL | Age: 55
Discharge: HOME | End: 2020-01-10
Attending: ORTHOPAEDIC SURGERY
Payer: COMMERCIAL

## 2020-01-10 ENCOUNTER — OFFICE VISIT (OUTPATIENT)
Dept: ORTHOPEDICS | Facility: CLINIC | Age: 55
End: 2020-01-10
Payer: COMMERCIAL

## 2020-01-10 ENCOUNTER — TELEPHONE (OUTPATIENT)
Dept: ORTHOPEDICS | Facility: CLINIC | Age: 55
End: 2020-01-10

## 2020-01-10 VITALS — BODY MASS INDEX: 29.73 KG/M2 | WEIGHT: 185 LBS | HEIGHT: 66 IN

## 2020-01-10 DIAGNOSIS — M16.11 PRIMARY OSTEOARTHRITIS OF RIGHT HIP: ICD-10-CM

## 2020-01-10 DIAGNOSIS — M16.11 PRIMARY OSTEOARTHRITIS OF RIGHT HIP: Primary | ICD-10-CM

## 2020-01-10 PROCEDURE — 99999 PR PBB SHADOW E&M-EST. PATIENT-LVL II: ICD-10-PCS | Mod: PBBFAC,,, | Performed by: ORTHOPAEDIC SURGERY

## 2020-01-10 PROCEDURE — 3008F BODY MASS INDEX DOCD: CPT | Mod: CPTII,S$GLB,, | Performed by: ORTHOPAEDIC SURGERY

## 2020-01-10 PROCEDURE — 73502 X-RAY EXAM HIP UNI 2-3 VIEWS: CPT | Mod: TC,FY,RT

## 2020-01-10 PROCEDURE — 99214 PR OFFICE/OUTPT VISIT, EST, LEVL IV, 30-39 MIN: ICD-10-PCS | Mod: S$GLB,,, | Performed by: ORTHOPAEDIC SURGERY

## 2020-01-10 PROCEDURE — 73502 X-RAY EXAM HIP UNI 2-3 VIEWS: CPT | Mod: 26,RT,, | Performed by: RADIOLOGY

## 2020-01-10 PROCEDURE — 3008F PR BODY MASS INDEX (BMI) DOCUMENTED: ICD-10-PCS | Mod: CPTII,S$GLB,, | Performed by: ORTHOPAEDIC SURGERY

## 2020-01-10 PROCEDURE — 99214 OFFICE O/P EST MOD 30 MIN: CPT | Mod: S$GLB,,, | Performed by: ORTHOPAEDIC SURGERY

## 2020-01-10 PROCEDURE — 73502 X-RAY EXAM HIP UNI 2-3 VIEWS: CPT | Mod: 26,LT,, | Performed by: RADIOLOGY

## 2020-01-10 PROCEDURE — 99999 PR PBB SHADOW E&M-EST. PATIENT-LVL II: CPT | Mod: PBBFAC,,, | Performed by: ORTHOPAEDIC SURGERY

## 2020-01-10 PROCEDURE — 73502 X-RAY EXAM HIP UNI 2-3 VIEWS: CPT | Mod: TC,FY,LT

## 2020-01-10 PROCEDURE — 73502 XR HIP 2 VIEW LEFT: ICD-10-PCS | Mod: 26,LT,, | Performed by: RADIOLOGY

## 2020-01-10 NOTE — PROGRESS NOTES
Subjective:      Patient ID: Perri Youngblood is a 54 y.o. female.    Chief Complaint: Pain of the Right Hip    HPI    The patient complains of increasing right groin pain radiating to the knee.  She has pain with ambulation and night pain. NSAIDs have not been helpful.  Her left total hip continues to be comfortable without any issue at this time.        Review of Systems   Constitution: Negative for fever and weight loss.   HENT: Negative for congestion.    Eyes: Negative for visual disturbance.   Cardiovascular: Negative for chest pain.   Respiratory: Negative for shortness of breath.    Hematologic/Lymphatic: Negative for bleeding problem. Does not bruise/bleed easily.   Skin: Negative for poor wound healing.   Musculoskeletal: Positive for joint pain.   Gastrointestinal: Negative for abdominal pain.   Genitourinary: Negative for dysuria.   Neurological: Negative for seizures.   Psychiatric/Behavioral: Negative for altered mental status.   Allergic/Immunologic: Negative for persistent infections.         Objective:      Ortho/SPM Exam    Right hip    The patient is not in acute distress.   Body habitus is:normal.   Sclerae normal  The patient walks with a limp.   Respiratory distress:  none  The skin over the hip is:intact.   There is:no local tenderness.   Range of motion- Flexion 80, External rotation 30, internal rotation -10.  Resisted SLR positive.  Pain with rotation positive  Sciatic tension findings negative.  Shortening/lengthening compared to the contralateral side exam deferred.  Pulses DP present, PT present.  Motor normal 5/5 strength in all tested muscle groups.   Sensory normal.    I reviewed the relevant radiographic images for the patient's condition:  Right hip radiographs show essentially complete loss of joint space with osteophytes and cyst.  Left hip has a well-positioned arthroplasty without complicating process.         Assessment:       Encounter Diagnosis   Name Primary?     Primary osteoarthritis of right hip Yes        The right hip has objectively advanced arthritis with significant pain and impairment of ADLs.  Oral medication has not been particularly helpful          Plan:       Perri was seen today for pain.    Diagnoses and all orders for this visit:    Primary osteoarthritis of right hip          I explained my diagnostic impression and the reasoning behind it in detail, using layman's terms.    The process of right total hip replacement was explained to the patient.  The nature of the procedure was explained using a model.  The expected perioperative clinical course and period of recovery as applicable to the patient's condition was described.  The risks including death, infection, thromboembolic events, instability, leg length discrepancy, persistent pain/stiffness, fracture around implant and implant failure due to wear or loosening, with possible need for reoperation, were all explained.  The possibility and expectations of continued nonsurgical care were reviewed.  The patient understands and wishes to proceed with the recommended operation.

## 2020-01-21 ENCOUNTER — OFFICE VISIT (OUTPATIENT)
Dept: ORTHOPEDICS | Facility: CLINIC | Age: 55
End: 2020-01-21
Payer: COMMERCIAL

## 2020-01-21 VITALS
SYSTOLIC BLOOD PRESSURE: 120 MMHG | OXYGEN SATURATION: 97 % | HEIGHT: 66 IN | HEART RATE: 67 BPM | DIASTOLIC BLOOD PRESSURE: 70 MMHG | WEIGHT: 193 LBS | TEMPERATURE: 98 F | BODY MASS INDEX: 31.02 KG/M2 | RESPIRATION RATE: 16 BRPM

## 2020-01-21 DIAGNOSIS — Z01.818 PREOP EXAMINATION: ICD-10-CM

## 2020-01-21 DIAGNOSIS — M16.11 PRIMARY OSTEOARTHRITIS OF RIGHT HIP: Primary | ICD-10-CM

## 2020-01-21 PROCEDURE — 99499 UNLISTED E&M SERVICE: CPT | Mod: S$GLB,,, | Performed by: PHYSICIAN ASSISTANT

## 2020-01-21 PROCEDURE — 99999 PR PBB SHADOW E&M-EST. PATIENT-LVL IV: ICD-10-PCS | Mod: PBBFAC,,, | Performed by: PHYSICIAN ASSISTANT

## 2020-01-21 PROCEDURE — 99999 PR PBB SHADOW E&M-EST. PATIENT-LVL IV: CPT | Mod: PBBFAC,,, | Performed by: PHYSICIAN ASSISTANT

## 2020-01-21 PROCEDURE — 99499 NO LOS: ICD-10-PCS | Mod: S$GLB,,, | Performed by: PHYSICIAN ASSISTANT

## 2020-01-21 NOTE — H&P
CC: Right hip pain    Perri Youngblood is a 54 y.o. female here today for a pre-operative visit in preparation for a Right total hip arthroplasty to be performed by  Dr. Crisostomo on 2/3/20.      History of PVD with stent, HTN, RLS, GERD, and chronic LBP.     She had left [facility] by Dr. Crisostomo on 4/16/18.     Perri Youngblood has a chronic history of Right hip pain.  Pain is worse with activity and weight bearing. Patient has experienced interference of activities of daily living due to increased pain and decreased range of motion. Patient has failed non-operative treatment including NSAIDs, as well as greater than 3 months of activity modification. Perri Youngblood ambulates without any assistive device.     She will be medically optimized by the pre op center. There has been no significant change in medical status since last visit. No fever, chills, malaise, or unexplained weight change.     Cardiac clearance scanned into chart. She is at low to moderate risk for perioperative cardiac events during planned orthopaedic surgery per Dr. Eric Minor. He recommends continuing her beta blocker izabel-operatively.       Past Medical History:   Diagnosis Date    Arthritis     Hypotension, iatrogenic     May-Thurner syndrome     Osteopenia of lumbar spine     Osteoporosis     Peripheral vascular disease     PONV (postoperative nausea and vomiting)     Tachycardia        Past Surgical History:   Procedure Laterality Date    BLADDER SUSPENSION      CLOSED REDUCTION WRIST FRACTURE      FEMORAL ARTERY STENT      FUSION OF METATARSOPHALANGEAL JOINT Right 3/26/2019    Procedure: FUSION, MTP JOINT;  Surgeon: Rukhsana Kelly DPM;  Location: [facility];  Service: Podiatry;  Laterality: Right;    HIP REPLACEMENT ARTHROPLASTY Left     HYSTERECTOMY      JOINT REPLACEMENT      hip left       Family History   Problem Relation Age of Onset    Hypertension Mother     Cancer Father         colon cancer       Review of  "patient's allergies indicates:   Allergen Reactions    Adhesive Rash    Bactrim [sulfamethoxazole-trimethoprim] Rash    Sulfa (sulfonamide antibiotics) Hives         Current Outpatient Medications:     meloxicam (MOBIC) 15 MG tablet, Take 1 tablet (15 mg total) by mouth once daily., Disp: 30 tablet, Rfl: 5    metoprolol succinate (TOPROL-XL) 25 MG 24 hr tablet, Take 25 mg by mouth every evening. , Disp: , Rfl:     mirabegron (MYRBETRIQ) 50 mg Tb24, Take 1 tablet (50 mg total) by mouth once daily., Disp: 30 tablet, Rfl: 11    Review of Systems:   Constitutional: no fever or chills  Eyes: no visual changes  ENT: no nasal congestion or sore throat  Respiratory: no cough or shortness of breath  Cardiovascular: no chest pain or palpitations  Gastrointestinal: no nausea or vomiting, tolerating diet  Genitourinary: no hematuria or dysuria  Integument/Breast: no rash or pruritis  Hematologic/Lymphatic: no easy bruising or lymphadenopathy  Musculoskeletal: positive for hip pain  Neurological: no seizures or tremors  Behavioral/Psych: no auditory or visual hallucinations  Endocrine: no heat or cold intolerance    PE:  /70 (BP Location: Right arm, Patient Position: Sitting, BP Method: X-Large (Manual))   Pulse 67   Temp 98.3 °F (36.8 °C) (Oral)   Resp 16   Ht 5' 6" (1.676 m)   Wt 87.5 kg (193 lb)   SpO2 97%   BMI 31.15 kg/m²   General: Pleasant, cooperative, NAD   Gait: antalgic  HEENT: Normocephalic/Atraumatic, sclera nonicteric   Lungs: Respirations are clear, equal and unlabored.   CV: S1S2; 2+ bilateral upper and lower extremity pulses.   Skin: Intact throughout LE with no rashes, erythema, or lesions  Extremities: No LE edema, NVI lower extremities    Right Hip Exam:   The skin over the hip is:intact.   There is:no local tenderness.   Range of motion- Flexion 80, External rotation 30, internal rotation -10.  Resisted SLR positive.  Pain with rotation positive  Sciatic tension findings " negative.  Shortening/lengthening compared to the contralateral side exam deferred.  Pulses DP present, PT present.  Motor normal 5/5 strength in all tested muscle groups.   Sensory normal.    Radiographs: Radiographs reveal essentially complete loss of joint space with osteophytes and cyst    Diagnosis: osteoarthritis Right hip    Plan: Right total hip arthroplasty on 2/3/20.    Pre-op labs to be done including CBC with diff and CMP.     As above, cardiac clearance scanned into chart. She is at low to moderate risk for perioperative cardiac events during planned orthopaedic surgery per Dr. Eric Minor. He recommends continuing her beta blocker izabel-operatively.     She does not need to repeat [facility] [place]. She has equipment at home. Anesthesia will call her for her pre-op visit.     Patient has 2 week postop scheduled with Dr. Crisostomo on 2/18/20.

## 2020-01-28 ENCOUNTER — TELEPHONE (OUTPATIENT)
Dept: ORTHOPEDICS | Facility: CLINIC | Age: 55
End: 2020-01-28

## 2020-01-28 NOTE — TELEPHONE ENCOUNTER
----- Message from Lashae Ellis PA-C sent at 1/28/2020 11:16 AM CST -----  Contact: Self [phone]  We can proceed with surgery. Instruct pt to finish abx.    ----- Message -----  From: Iris Rodriguez LPN  Sent: 1/28/2020  10:00 AM CST  To: Lashae Ellis PA-C    Pt states she started cipro 500 mg yesterday for one week.  ----- Message -----  From: Lashae Ellis PA-C  Sent: 1/28/2020   9:51 AM CST  To: Iris Rodriguez LPN    On abx?  ----- Message -----  From: Iris Rodriguez LPN  Sent: 1/28/2020   9:37 AM CST  To: Lashae Ellis PA-C, TUCKER Reece,     Please see below. Her surgery is scheduled 02/03.  ----- Message -----  From: Sue Gil  Sent: 1/28/2020   9:28 AM CST  To: Kranthi Gimenez Staff    Patient is calling to talk to nurse in regards to see if its ok to still have the procedure even if she has a bladder infection.

## 2020-02-03 PROBLEM — Z96.641 HISTORY OF RIGHT HIP REPLACEMENT: Status: ACTIVE | Noted: 2020-02-03

## 2020-02-05 PROCEDURE — G0180 PR HOME HEALTH MD CERTIFICATION: ICD-10-PCS | Mod: ,,, | Performed by: ORTHOPAEDIC SURGERY

## 2020-02-05 PROCEDURE — G0180 MD CERTIFICATION HHA PATIENT: HCPCS | Mod: ,,, | Performed by: ORTHOPAEDIC SURGERY

## 2020-02-13 ENCOUNTER — EXTERNAL HOME HEALTH (OUTPATIENT)
Dept: HOME HEALTH SERVICES | Facility: HOSPITAL | Age: 55
End: 2020-02-13
Payer: COMMERCIAL

## 2020-02-17 ENCOUNTER — TELEPHONE (OUTPATIENT)
Dept: ORTHOPEDICS | Facility: CLINIC | Age: 55
End: 2020-02-17

## 2020-02-17 DIAGNOSIS — M16.11 PRIMARY OSTEOARTHRITIS OF RIGHT HIP: Primary | ICD-10-CM

## 2020-02-18 ENCOUNTER — OFFICE VISIT (OUTPATIENT)
Dept: ORTHOPEDICS | Facility: CLINIC | Age: 55
End: 2020-02-18
Payer: COMMERCIAL

## 2020-02-18 VITALS — HEIGHT: 66 IN | BODY MASS INDEX: 31.5 KG/M2 | WEIGHT: 196 LBS

## 2020-02-18 DIAGNOSIS — Z96.642 S/P HIP REPLACEMENT, LEFT: Primary | ICD-10-CM

## 2020-02-18 PROCEDURE — 99999 PR PBB SHADOW E&M-EST. PATIENT-LVL II: CPT | Mod: PBBFAC,,, | Performed by: ORTHOPAEDIC SURGERY

## 2020-02-18 PROCEDURE — 99999 PR PBB SHADOW E&M-EST. PATIENT-LVL II: ICD-10-PCS | Mod: PBBFAC,,, | Performed by: ORTHOPAEDIC SURGERY

## 2020-02-18 PROCEDURE — 99024 PR POST-OP FOLLOW-UP VISIT: ICD-10-PCS | Mod: S$GLB,,, | Performed by: ORTHOPAEDIC SURGERY

## 2020-02-18 PROCEDURE — 99024 POSTOP FOLLOW-UP VISIT: CPT | Mod: S$GLB,,, | Performed by: ORTHOPAEDIC SURGERY

## 2020-02-18 RX ORDER — OXYCODONE AND ACETAMINOPHEN 5; 325 MG/1; MG/1
1 TABLET ORAL EVERY 8 HOURS PRN
Qty: 45 TABLET | Refills: 0 | Status: SHIPPED | OUTPATIENT
Start: 2020-02-18 | End: 2021-10-06

## 2020-02-18 NOTE — PROGRESS NOTES
"Subjective:      Patient ID: Perri Youngblood is a 55 y.o. female.    Chief Complaint: Post-op Evaluation of the Right Hip      HPI:  Two weeks postop  The patient is seen for postop follow-up of right  MEGHA.  Pain control has been satisfactory  They feel that they are ambulating with difficulty  Preoperative complaints include:  Low blood pressure      Current Outpatient Medications:     ascorbic acid, vitamin C, (VITAMIN C) 1000 MG tablet, Take 1,000 mg by mouth once daily., Disp: , Rfl:     aspirin (ECOTRIN) 81 MG EC tablet, Take 1 tablet (81 mg total) by mouth once daily., Disp: , Rfl: 0    ciprofloxacin HCl (CIPRO) 500 MG tablet, Take 500 mg by mouth every 12 (twelve) hours., Disp: , Rfl:     meloxicam (MOBIC) 15 MG tablet, Take 1 tablet (15 mg total) by mouth once daily., Disp: 30 tablet, Rfl: 5    metoprolol succinate (TOPROL-XL) 25 MG 24 hr tablet, Take 25 mg by mouth every evening. , Disp: , Rfl:     mirabegron (MYRBETRIQ) 50 mg Tb24, Take 1 tablet (50 mg total) by mouth once daily., Disp: 30 tablet, Rfl: 11    oxyCODONE-acetaminophen (PERCOCET) 5-325 mg per tablet, Take 1 tablet by mouth every 6 (six) hours as needed for Pain., Disp: 45 tablet, Rfl: 0    vitamin D (VITAMIN D3) 1000 units Tab, Take 2,000 Units by mouth once daily., Disp: , Rfl:   Review of patient's allergies indicates:   Allergen Reactions    Adhesive Rash    Bactrim [sulfamethoxazole-trimethoprim] Rash    Sulfa (sulfonamide antibiotics) Hives       Ht 5' 6" (1.676 m)   Wt 88.9 kg (196 lb)   BMI 31.64 kg/m²     ROS        Objective:    Ortho Exam          Alert, oriented, no acute distress  Sclera-Normal  Respiratory distress-none  Gait:  Mild limp  Wound:  Normally healed  Range of motion:  Painless  Distal perfusion:  Intact  Distal neurologic:  Intact    Imaging:  Radiographs the right hip show concentric location of right hip replacement. Socket is more vertical than average      Assessment:             No diagnosis " found.    There appeared to be no mechanical issues with the hip.  The patient reports that her primary doctor wants her to have a CBC which I will order today.        Plan:          No follow-ups on file.    Gradual progression of activity explained    Check CBC    Patient encouraged to cut down on prescription pain medicine-refill given

## 2020-03-11 ENCOUNTER — HOSPITAL ENCOUNTER (OUTPATIENT)
Dept: RADIOLOGY | Facility: HOSPITAL | Age: 55
Discharge: HOME | End: 2020-03-11
Attending: ORTHOPAEDIC SURGERY
Payer: COMMERCIAL

## 2020-03-11 ENCOUNTER — TELEPHONE (OUTPATIENT)
Dept: ORTHOPEDICS | Facility: CLINIC | Age: 55
End: 2020-03-11

## 2020-03-11 ENCOUNTER — OFFICE VISIT (OUTPATIENT)
Dept: ORTHOPEDICS | Facility: CLINIC | Age: 55
End: 2020-03-11
Payer: COMMERCIAL

## 2020-03-11 VITALS — HEIGHT: 66 IN | WEIGHT: 196 LBS | BODY MASS INDEX: 31.5 KG/M2

## 2020-03-11 DIAGNOSIS — M25.551 RIGHT HIP PAIN: Primary | ICD-10-CM

## 2020-03-11 DIAGNOSIS — M25.551 RIGHT HIP PAIN: ICD-10-CM

## 2020-03-11 DIAGNOSIS — M25.561 ACUTE PAIN OF BOTH KNEES: Primary | ICD-10-CM

## 2020-03-11 DIAGNOSIS — Z96.642 S/P HIP REPLACEMENT, LEFT: Primary | ICD-10-CM

## 2020-03-11 DIAGNOSIS — M25.562 LEFT KNEE PAIN, UNSPECIFIED CHRONICITY: ICD-10-CM

## 2020-03-11 DIAGNOSIS — M17.11 PRIMARY OSTEOARTHRITIS OF RIGHT KNEE: ICD-10-CM

## 2020-03-11 DIAGNOSIS — M25.562 ACUTE PAIN OF BOTH KNEES: Primary | ICD-10-CM

## 2020-03-11 DIAGNOSIS — Z96.642 S/P HIP REPLACEMENT, LEFT: ICD-10-CM

## 2020-03-11 PROCEDURE — 73502 X-RAY EXAM HIP UNI 2-3 VIEWS: CPT | Mod: TC,PN,RT

## 2020-03-11 PROCEDURE — 99213 PR OFFICE/OUTPT VISIT, EST, LEVL III, 20-29 MIN: ICD-10-PCS | Mod: 24,S$GLB,, | Performed by: ORTHOPAEDIC SURGERY

## 2020-03-11 PROCEDURE — 73564 X-RAY EXAM KNEE 4 OR MORE: CPT | Mod: 26,LT,, | Performed by: RADIOLOGY

## 2020-03-11 PROCEDURE — 3008F PR BODY MASS INDEX (BMI) DOCUMENTED: ICD-10-PCS | Mod: CPTII,S$GLB,, | Performed by: ORTHOPAEDIC SURGERY

## 2020-03-11 PROCEDURE — 99213 OFFICE O/P EST LOW 20 MIN: CPT | Mod: 24,S$GLB,, | Performed by: ORTHOPAEDIC SURGERY

## 2020-03-11 PROCEDURE — 99999 PR PBB SHADOW E&M-EST. PATIENT-LVL III: ICD-10-PCS | Mod: PBBFAC,,, | Performed by: ORTHOPAEDIC SURGERY

## 2020-03-11 PROCEDURE — 99024 POSTOP FOLLOW-UP VISIT: CPT | Mod: S$GLB,,, | Performed by: ORTHOPAEDIC SURGERY

## 2020-03-11 PROCEDURE — 73564 X-RAY EXAM KNEE 4 OR MORE: CPT | Mod: TC,PN,RT

## 2020-03-11 PROCEDURE — 73564 XR KNEE COMP 4 OR MORE VIEWS LEFT: ICD-10-PCS | Mod: 26,LT,, | Performed by: RADIOLOGY

## 2020-03-11 PROCEDURE — 99999 PR PBB SHADOW E&M-EST. PATIENT-LVL III: CPT | Mod: PBBFAC,,, | Performed by: ORTHOPAEDIC SURGERY

## 2020-03-11 PROCEDURE — 73502 X-RAY EXAM HIP UNI 2-3 VIEWS: CPT | Mod: 26,RT,, | Performed by: RADIOLOGY

## 2020-03-11 PROCEDURE — 73502 X-RAY EXAM HIP UNI 2-3 VIEWS: CPT | Mod: 26,LT,, | Performed by: RADIOLOGY

## 2020-03-11 PROCEDURE — 73564 X-RAY EXAM KNEE 4 OR MORE: CPT | Mod: 26,RT,, | Performed by: RADIOLOGY

## 2020-03-11 PROCEDURE — 3008F BODY MASS INDEX DOCD: CPT | Mod: CPTII,S$GLB,, | Performed by: ORTHOPAEDIC SURGERY

## 2020-03-11 PROCEDURE — 73564 XR KNEE COMP 4 OR MORE VIEWS RIGHT: ICD-10-PCS | Mod: 26,RT,, | Performed by: RADIOLOGY

## 2020-03-11 PROCEDURE — 73502 X-RAY EXAM HIP UNI 2-3 VIEWS: CPT | Mod: TC,PN,LT

## 2020-03-11 PROCEDURE — 99024 PR POST-OP FOLLOW-UP VISIT: ICD-10-PCS | Mod: S$GLB,,, | Performed by: ORTHOPAEDIC SURGERY

## 2020-03-11 PROCEDURE — 73564 X-RAY EXAM KNEE 4 OR MORE: CPT | Mod: TC,PN,LT

## 2020-03-11 PROCEDURE — 73502 XR HIP 2 VIEW LEFT: ICD-10-PCS | Mod: 26,LT,, | Performed by: RADIOLOGY

## 2020-03-11 NOTE — TELEPHONE ENCOUNTER
Spoke with patient. Appointment made today for 1:30 to see Dr Crisostomo. Patient is aware of time and location.

## 2020-03-11 NOTE — TELEPHONE ENCOUNTER
----- Message from Isidra Chen sent at 3/11/2020  9:32 AM CDT -----  Contact: [phone]  Patient is requesting a call back concerning A same Day appt, the pt fell down her stairs this morning and her right leg got pinned behind her and she also states her hip is hurting. Patient has not gone to the ER yet she wanted to talk to the Dr First. Please call

## 2020-03-11 NOTE — PROGRESS NOTES
"Subjective:      Patient ID: Perri Youngblood is a 55 y.o. female.    Chief Complaint: Post-op Evaluation of the Right Hip; Pain of the Left Hip; Pain and Swelling of the Right Knee; and Pain of the Left Knee      HPI:    The actual reason for today's visit is bilateral knee pain because of a fall at home.    Patient is also  Six weeks postop  The patient is seen for postop follow-up of right  MEGHA.  The patient reports falling on her stairs today.  Since then she has pain in both hips and both knees.  She denies head injury or loss of consciousness.  She actually feels discomfort in both knees more than anywhere else.  She indicates the left is worse.      Current Outpatient Medications:     ascorbic acid, vitamin C, (VITAMIN C) 1000 MG tablet, Take 1,000 mg by mouth once daily., Disp: , Rfl:     aspirin (ECOTRIN) 81 MG EC tablet, Take 1 tablet (81 mg total) by mouth once daily., Disp: , Rfl: 0    ciprofloxacin HCl (CIPRO) 500 MG tablet, Take 500 mg by mouth every 12 (twelve) hours., Disp: , Rfl:     meloxicam (MOBIC) 15 MG tablet, Take 1 tablet (15 mg total) by mouth once daily., Disp: 30 tablet, Rfl: 5    metoprolol succinate (TOPROL-XL) 25 MG 24 hr tablet, Take 25 mg by mouth every evening. , Disp: , Rfl:     mirabegron (MYRBETRIQ) 50 mg Tb24, Take 1 tablet (50 mg total) by mouth once daily., Disp: 30 tablet, Rfl: 11    oxyCODONE-acetaminophen (PERCOCET) 5-325 mg per tablet, Take 1 tablet by mouth every 8 (eight) hours as needed for Pain., Disp: 45 tablet, Rfl: 0    vitamin D (VITAMIN D3) 1000 units Tab, Take 2,000 Units by mouth once daily., Disp: , Rfl:   Review of patient's allergies indicates:   Allergen Reactions    Adhesive Rash    Bactrim [sulfamethoxazole-trimethoprim] Rash    Sulfa (sulfonamide antibiotics) Hives       Ht 5' 6" (1.676 m)   Wt 88.9 kg (196 lb)   BMI 31.64 kg/m²     Review of Systems   Constitution: Negative for fever and weight loss.   HENT: Negative for congestion.  "   Eyes: Negative for visual disturbance.   Cardiovascular: Negative for chest pain.   Respiratory: Negative for shortness of breath.    Hematologic/Lymphatic: Negative for bleeding problem. Does not bruise/bleed easily.   Skin: Negative for poor wound healing.   Musculoskeletal: Positive for joint pain.   Gastrointestinal: Negative for abdominal pain.   Genitourinary: Negative for dysuria.   Neurological: Negative for seizures.   Psychiatric/Behavioral: Negative for altered mental status.   Allergic/Immunologic: Negative for persistent infections.           Objective:    Ortho Exam          Alert, oriented, no acute distress  Sclera-Normal  Respiratory distress-none  Gait:  Antalgic limp present  Wound:  Both hip incisions nicely healed  Range of motion:  Both hips have painless range of motion    Right knee  Skin intact  Trace swelling  Diffuse tenderness  Full range of motion  Stable to valgus and varus stress  Patient can extend fully against gravity  Distal neurovascular exam is intact    Left knee is identical    I reviewed the relevant imaging for the patient's condition:  Radiographs of both hips show concentrically located total hip replacements without periprosthetic fracture or change in implant position.  Both knees have moderate joint space narrowing without any evidence of acute fracture        Assessment:             1. Acute pain of both knees        This probably represents soft tissue strains.  I cannot rule out nondisplaced fractures such as pelvic rami fractures.  I explained this.            Plan:          No follow-ups on file.    I explained my diagnostic impression and the reasoning behind it in detail, using layman's terms. I recommend for the next 2 weeks the patient uses a cane.  She is advised to supplement her usual dose of meloxicam with an extra dose daily for 3 days.  Appropriate activity modification and safety precautions discussed.

## 2020-08-18 ENCOUNTER — OFFICE VISIT (OUTPATIENT)
Dept: URGENT CARE | Facility: CLINIC | Age: 55
End: 2020-08-18
Payer: COMMERCIAL

## 2020-08-18 VITALS
OXYGEN SATURATION: 99 % | SYSTOLIC BLOOD PRESSURE: 133 MMHG | WEIGHT: 196 LBS | BODY MASS INDEX: 31.5 KG/M2 | DIASTOLIC BLOOD PRESSURE: 88 MMHG | TEMPERATURE: 98 F | HEIGHT: 66 IN | HEART RATE: 96 BPM | RESPIRATION RATE: 17 BRPM

## 2020-08-18 DIAGNOSIS — R51.9 LEFT FACIAL PAIN: ICD-10-CM

## 2020-08-18 DIAGNOSIS — S01.112A EYEBROW LACERATION, LEFT, INITIAL ENCOUNTER: Primary | ICD-10-CM

## 2020-08-18 DIAGNOSIS — M79.642 LEFT HAND PAIN: ICD-10-CM

## 2020-08-18 DIAGNOSIS — T07.XXXA MULTIPLE ABRASIONS: ICD-10-CM

## 2020-08-18 PROCEDURE — 70110 X-RAY EXAM OF JAW 4/> VIEWS: CPT | Mod: S$GLB,,, | Performed by: RADIOLOGY

## 2020-08-18 PROCEDURE — 99203 PR OFFICE/OUTPT VISIT, NEW, LEVL III, 30-44 MIN: ICD-10-PCS | Mod: S$GLB,,, | Performed by: NURSE PRACTITIONER

## 2020-08-18 PROCEDURE — 73130 X-RAY EXAM OF HAND: CPT | Mod: LT,S$GLB,, | Performed by: RADIOLOGY

## 2020-08-18 PROCEDURE — 70110 XR MANDIBLE MORE THAN 4 VIEWS: ICD-10-PCS | Mod: S$GLB,,, | Performed by: RADIOLOGY

## 2020-08-18 PROCEDURE — 99203 OFFICE O/P NEW LOW 30 MIN: CPT | Mod: S$GLB,,, | Performed by: NURSE PRACTITIONER

## 2020-08-18 PROCEDURE — 73130 XR HAND COMPLETE 3 VIEW LEFT: ICD-10-PCS | Mod: LT,S$GLB,, | Performed by: RADIOLOGY

## 2020-08-18 RX ORDER — CEPHALEXIN 500 MG/1
500 CAPSULE ORAL EVERY 8 HOURS
Qty: 21 CAPSULE | Refills: 0 | Status: SHIPPED | OUTPATIENT
Start: 2020-08-18 | End: 2020-08-25

## 2020-08-18 RX ORDER — MUPIROCIN 20 MG/G
OINTMENT TOPICAL
Qty: 22 G | Refills: 1 | Status: SHIPPED | OUTPATIENT
Start: 2020-08-18 | End: 2021-10-06

## 2020-08-18 NOTE — PROCEDURES
"Laceration Repair    Date/Time: 2020 3:40 PM  Performed by: Leonora López NP  Authorized by: Leonora López NP   Consent Done: Yes  Consent: Verbal consent obtained. Written consent not obtained.  Patient identity confirmed: name,  and verbally with patient  Time out: Immediately prior to procedure a "time out" was called to verify the correct patient, procedure, equipment, support staff and site/side marked as required.  Body area: head/neck  Wound length (cm): 1.25cm.  Contaminated: none.  Foreign bodies: no foreign bodies  Tendon involvement: none  Nerve involvement: none  Vascular damage: no    Anesthesia:  Local anesthetic: none.  Preparation: Patient was prepped and draped in the usual sterile fashion.  Irrigation solution: saline (betadine swabs)  Amount of cleaning: extensive  Debridement: none  Degree of undermining: none  Skin closure: glue  Approximation: close  Approximation difficulty: simple  Dressing: open (no dressing)  Patient tolerance: Patient tolerated the procedure well with no immediate complications        "
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
## 2020-09-10 ENCOUNTER — HOSPITAL ENCOUNTER (OUTPATIENT)
Dept: RADIOLOGY | Facility: HOSPITAL | Age: 55
Discharge: HOME | End: 2020-09-10
Attending: NURSE PRACTITIONER
Payer: COMMERCIAL

## 2020-09-10 DIAGNOSIS — S09.90XA UNSPECIFIED INJURY OF HEAD, INITIAL ENCOUNTER: ICD-10-CM

## 2020-09-10 PROCEDURE — 70551 MRI BRAIN STEM W/O DYE: CPT | Mod: TC

## 2020-09-10 PROCEDURE — 70551 MRI BRAIN WITHOUT CONTRAST: ICD-10-PCS | Mod: 26,,, | Performed by: RADIOLOGY

## 2020-09-10 PROCEDURE — 70551 MRI BRAIN STEM W/O DYE: CPT | Mod: 26,,, | Performed by: RADIOLOGY

## 2020-11-09 ENCOUNTER — TELEPHONE (OUTPATIENT)
Dept: ORTHOPEDICS | Facility: CLINIC | Age: 55
End: 2020-11-09

## 2020-11-09 DIAGNOSIS — M25.561 PAIN IN BOTH KNEES, UNSPECIFIED CHRONICITY: Primary | ICD-10-CM

## 2020-11-09 DIAGNOSIS — M25.562 PAIN IN BOTH KNEES, UNSPECIFIED CHRONICITY: Primary | ICD-10-CM

## 2020-11-10 ENCOUNTER — OFFICE VISIT (OUTPATIENT)
Dept: ORTHOPEDICS | Facility: CLINIC | Age: 55
End: 2020-11-10
Payer: COMMERCIAL

## 2020-11-10 VITALS — HEIGHT: 66 IN | WEIGHT: 196 LBS | BODY MASS INDEX: 31.5 KG/M2

## 2020-11-10 DIAGNOSIS — Z96.641 S/P HIP REPLACEMENT, RIGHT: ICD-10-CM

## 2020-11-10 DIAGNOSIS — M17.0 PRIMARY OSTEOARTHRITIS OF BOTH KNEES: Primary | ICD-10-CM

## 2020-11-10 DIAGNOSIS — Z96.642 S/P HIP REPLACEMENT, LEFT: ICD-10-CM

## 2020-11-10 PROCEDURE — 99213 PR OFFICE/OUTPT VISIT, EST, LEVL III, 20-29 MIN: ICD-10-PCS | Mod: 25,S$GLB,, | Performed by: ORTHOPAEDIC SURGERY

## 2020-11-10 PROCEDURE — 99999 PR PBB SHADOW E&M-EST. PATIENT-LVL IV: CPT | Mod: PBBFAC,,, | Performed by: ORTHOPAEDIC SURGERY

## 2020-11-10 PROCEDURE — 3008F BODY MASS INDEX DOCD: CPT | Mod: CPTII,S$GLB,, | Performed by: ORTHOPAEDIC SURGERY

## 2020-11-10 PROCEDURE — 99999 PR PBB SHADOW E&M-EST. PATIENT-LVL IV: ICD-10-PCS | Mod: PBBFAC,,, | Performed by: ORTHOPAEDIC SURGERY

## 2020-11-10 PROCEDURE — 20610 DRAIN/INJ JOINT/BURSA W/O US: CPT | Mod: 50,S$GLB,, | Performed by: ORTHOPAEDIC SURGERY

## 2020-11-10 PROCEDURE — 3008F PR BODY MASS INDEX (BMI) DOCUMENTED: ICD-10-PCS | Mod: CPTII,S$GLB,, | Performed by: ORTHOPAEDIC SURGERY

## 2020-11-10 PROCEDURE — 99213 OFFICE O/P EST LOW 20 MIN: CPT | Mod: 25,S$GLB,, | Performed by: ORTHOPAEDIC SURGERY

## 2020-11-10 PROCEDURE — 20610 PR DRAIN/INJECT LARGE JOINT/BURSA: ICD-10-PCS | Mod: 50,S$GLB,, | Performed by: ORTHOPAEDIC SURGERY

## 2020-11-10 RX ORDER — DIAZEPAM 5 MG/1
TABLET ORAL
COMMUNITY
Start: 2020-09-09 | End: 2021-10-06

## 2020-11-10 RX ORDER — TRIAMCINOLONE ACETONIDE 40 MG/ML
80 INJECTION, SUSPENSION INTRA-ARTICULAR; INTRAMUSCULAR
Status: DISCONTINUED | OUTPATIENT
Start: 2020-11-10 | End: 2020-11-10 | Stop reason: HOSPADM

## 2020-11-10 RX ORDER — FERROUS SULFATE 325(65) MG
TABLET ORAL
COMMUNITY
End: 2022-06-01

## 2020-11-10 RX ADMIN — TRIAMCINOLONE ACETONIDE 80 MG: 40 INJECTION, SUSPENSION INTRA-ARTICULAR; INTRAMUSCULAR at 09:11

## 2020-11-10 NOTE — PROGRESS NOTES
Subjective:      Patient ID: Perri Youngblood is a 55 y.o. female.    Chief Complaint: Knee Pain (bilateral), Post-op Evaluation (6 ), and Medication Refill (Mobic)    HPI  Follow-up for osteoarthritis of both knees.  The patient reports past injections were very helpful and she would like them repeated.  Complains of lateral anterior pain in both knees, aggravated by prolonged walking and vigorous activity.  She reports that her total hips heal very comfortable.            Review of Systems   Constitution: Negative for fever and weight loss.   HENT: Negative for congestion.    Eyes: Negative for visual disturbance.   Cardiovascular: Negative for chest pain.   Respiratory: Negative for shortness of breath.    Hematologic/Lymphatic: Negative for bleeding problem. Does not bruise/bleed easily.   Skin: Negative for poor wound healing.   Musculoskeletal: Positive for joint pain.   Gastrointestinal: Negative for abdominal pain.   Genitourinary: Negative for dysuria.   Neurological: Negative for seizures.   Psychiatric/Behavioral: Negative for altered mental status.   Allergic/Immunologic: Negative for persistent infections.         Objective:      Ortho/SPM Exam      Right knee    [unfilled]    The patient is not in acute distress.   Sclerae normal  Body habitus is normal.  Respiratory distress:  none   The patient walks without a limp.  Hip irritability  negative.   The skin over the knee is intact.  Knee effusion trace  Tendernes is located none  Range of motion- Flexion 140 deg, Extension 0 deg,   Ligament laxity exam:   MCL 0   Lachman 0   Post sag  0    LCL 1+  Patellar apprehension negative.  Popliteal cyst positive  Patellar crepitation present.  Meniscal irritability not applicable  Pulses DP present, PT present.  Motor normal 5/5 strength in all tested muscle groups.   Sensory normal.    Left knee is identical          Assessment:       Encounter Diagnoses   Name Primary?    Primary osteoarthritis of both  knees Yes    S/P hip replacement, right     S/P hip replacement, left         There is moderate DJD in the lateral compartment of both knees.  So far, symptoms are controlled nonsurgically.          Plan:       Perri was seen today for knee pain, post-op evaluation and medication refill.    Diagnoses and all orders for this visit:    Primary osteoarthritis of both knees    S/P hip replacement, right    S/P hip replacement, left          I explained my diagnostic impression and the reasoning behind it in detail, using layman's terms.  Models and/or pictures were used to help in the explanation.    Injection requested and consent given for both knees    I explained the potential role of surgery in the treatment of this condition to the patient.  They understand that if nonsurgical measures do not adequately control symptoms, surgery will be considered in the future.    X-rays next visit

## 2020-11-10 NOTE — PROCEDURES
Large Joint Aspiration/Injection    Date/Time: 11/10/2020 9:00 AM  Performed by: Papa Crisostomo MD  Authorized by: Papa Crisostomo MD     Medications:  80 mg triamcinolone acetonide 40 mg/mL     After obtaining verbal informed consent, both of the patient's knees were prepped aseptically and injected through an inferior lateral approach using 40 mg of triamcinolone and 1 cc of 1% plain Xylocaine.  The patient was warned about postinjection flare and how to manage it with ice, rest and over-the-counter analgesics.  They're advised to contact me for any severe, uncontrolled pain.

## 2020-12-16 ENCOUNTER — HOSPITAL ENCOUNTER (OUTPATIENT)
Dept: RADIOLOGY | Facility: HOSPITAL | Age: 55
Discharge: HOME | End: 2020-12-16
Attending: NURSE PRACTITIONER
Payer: COMMERCIAL

## 2020-12-16 DIAGNOSIS — M54.50 LOW BACK PAIN: ICD-10-CM

## 2020-12-16 PROCEDURE — 72100 X-RAY EXAM L-S SPINE 2/3 VWS: CPT | Mod: 26,,, | Performed by: RADIOLOGY

## 2020-12-16 PROCEDURE — 72100 XR LUMBAR SPINE 2 OR 3 VIEWS: ICD-10-PCS | Mod: 26,,, | Performed by: RADIOLOGY

## 2020-12-16 PROCEDURE — 72100 X-RAY EXAM L-S SPINE 2/3 VWS: CPT | Mod: TC

## 2021-10-06 PROBLEM — E21.3 HYPERPARATHYROIDISM: Status: ACTIVE | Noted: 2021-10-06

## 2021-12-08 PROBLEM — E55.9 VITAMIN D DEFICIENCY: Status: ACTIVE | Noted: 2021-12-08

## 2022-05-11 PROBLEM — M17.12 OSTEOARTHRITIS OF LEFT KNEE: Status: ACTIVE | Noted: 2022-05-11

## 2022-06-21 PROBLEM — M17.11 OSTEOARTHRITIS OF RIGHT KNEE: Status: ACTIVE | Noted: 2022-06-21

## 2023-06-16 ENCOUNTER — PATIENT MESSAGE (OUTPATIENT)
Dept: PODIATRY | Facility: CLINIC | Age: 58
End: 2023-06-16
Payer: COMMERCIAL

## 2023-07-05 ENCOUNTER — HOSPITAL ENCOUNTER (OUTPATIENT)
Dept: RADIOLOGY | Facility: HOSPITAL | Age: 58
Discharge: HOME | End: 2023-07-05
Attending: NURSE PRACTITIONER
Payer: COMMERCIAL

## 2023-07-05 DIAGNOSIS — M25.552 PAIN IN LEFT HIP: ICD-10-CM

## 2023-07-05 DIAGNOSIS — M54.50 LOW BACK PAIN: ICD-10-CM

## 2023-07-05 PROCEDURE — 72100 X-RAY EXAM L-S SPINE 2/3 VWS: CPT | Mod: TC

## 2023-07-05 PROCEDURE — 73502 X-RAY EXAM HIP UNI 2-3 VIEWS: CPT | Mod: TC,LT

## 2023-07-05 PROCEDURE — 73502 XR HIP WITH PELVIS WHEN PERFORMED, 2 OR 3 VIEWS LEFT: ICD-10-PCS | Mod: 26,LT,, | Performed by: RADIOLOGY

## 2023-07-05 PROCEDURE — 72100 X-RAY EXAM L-S SPINE 2/3 VWS: CPT | Mod: 26,,, | Performed by: RADIOLOGY

## 2023-07-05 PROCEDURE — 72100 XR LUMBAR SPINE AP AND LATERAL: ICD-10-PCS | Mod: 26,,, | Performed by: RADIOLOGY

## 2023-07-05 PROCEDURE — 73502 X-RAY EXAM HIP UNI 2-3 VIEWS: CPT | Mod: 26,LT,, | Performed by: RADIOLOGY

## (undated) DEVICE — SYR ONLY LUER LOCK 20CC

## (undated) DEVICE — BANDAGE ADHESIVE

## (undated) DEVICE — NDL SPINAL 20GX3.5 HUB

## (undated) DEVICE — APPLICATOR CHLORAPREP ORN 26ML

## (undated) DEVICE — SYR 10CC LUER LOCK

## (undated) DEVICE — ADAPTER TUBING 18G